# Patient Record
Sex: FEMALE | Race: WHITE | Employment: UNEMPLOYED | URBAN - METROPOLITAN AREA
[De-identification: names, ages, dates, MRNs, and addresses within clinical notes are randomized per-mention and may not be internally consistent; named-entity substitution may affect disease eponyms.]

---

## 2018-08-13 ENCOUNTER — HOSPITAL ENCOUNTER (OUTPATIENT)
Dept: OTHER | Age: 33
Discharge: HOME | End: 2018-08-20
Attending: ADVANCED PRACTICE MIDWIFE | Admitting: ADVANCED PRACTICE MIDWIFE

## 2018-11-10 ENCOUNTER — HOSPITAL ENCOUNTER (OUTPATIENT)
Age: 33
Discharge: HOME | End: 2018-11-10
Attending: OBSTETRICS & GYNECOLOGY | Admitting: OBSTETRICS & GYNECOLOGY
Payer: COMMERCIAL

## 2018-11-10 VITALS
SYSTOLIC BLOOD PRESSURE: 109 MMHG | RESPIRATION RATE: 18 BRPM | TEMPERATURE: 98.3 F | BODY MASS INDEX: 18.56 KG/M2 | DIASTOLIC BLOOD PRESSURE: 55 MMHG | HEART RATE: 83 BPM | HEIGHT: 66 IN

## 2018-11-10 LAB
AMYLASE: 59 U/L (ref 25–115)
BASOPHILS ABSOLUTE: 0 K/UL (ref 0–0.2)
BASOPHILS RELATIVE PERCENT: 0.2 %
BILIRUBIN URINE: NEGATIVE
BLOOD, URINE: ABNORMAL
CLARITY: CLEAR
COLOR: YELLOW
EOSINOPHILS ABSOLUTE: 0.1 K/UL (ref 0–0.6)
EOSINOPHILS RELATIVE PERCENT: 1.1 %
EPITHELIAL CELLS, UA: 2 /HPF (ref 0–5)
GLUCOSE URINE: NEGATIVE MG/DL
HCT VFR BLD CALC: 30.3 % (ref 36–48)
HEMOGLOBIN: 10.2 G/DL (ref 12–16)
HYALINE CASTS: 2 /LPF (ref 0–8)
KETONES, URINE: NEGATIVE MG/DL
LEUKOCYTE ESTERASE, URINE: NEGATIVE
LIPASE: 44 U/L (ref 13–60)
LYMPHOCYTES ABSOLUTE: 1.4 K/UL (ref 1–5.1)
LYMPHOCYTES RELATIVE PERCENT: 11.2 %
MCH RBC QN AUTO: 30.6 PG (ref 26–34)
MCHC RBC AUTO-ENTMCNC: 33.6 G/DL (ref 31–36)
MCV RBC AUTO: 91.1 FL (ref 80–100)
MICROSCOPIC EXAMINATION: YES
MONOCYTES ABSOLUTE: 0.9 K/UL (ref 0–1.3)
MONOCYTES RELATIVE PERCENT: 7.2 %
NEUTROPHILS ABSOLUTE: 10.3 K/UL (ref 1.7–7.7)
NEUTROPHILS RELATIVE PERCENT: 80.3 %
NITRITE, URINE: NEGATIVE
PDW BLD-RTO: 12.7 % (ref 12.4–15.4)
PH UA: 5.5
PLATELET # BLD: 228 K/UL (ref 135–450)
PMV BLD AUTO: 8.1 FL (ref 5–10.5)
PROTEIN UA: NEGATIVE MG/DL
RBC # BLD: 3.33 M/UL (ref 4–5.2)
RBC UA: 66 /HPF (ref 0–4)
SPECIFIC GRAVITY UA: 1.02
URINE TYPE: ABNORMAL
UROBILINOGEN, URINE: 0.2 E.U./DL
WBC # BLD: 12.9 K/UL (ref 4–11)
WBC UA: 4 /HPF (ref 0–5)

## 2018-11-10 PROCEDURE — 87086 URINE CULTURE/COLONY COUNT: CPT

## 2018-11-10 PROCEDURE — 99215 OFFICE O/P EST HI 40 MIN: CPT

## 2018-11-10 PROCEDURE — 85025 COMPLETE CBC W/AUTO DIFF WBC: CPT

## 2018-11-10 PROCEDURE — 81001 URINALYSIS AUTO W/SCOPE: CPT

## 2018-11-10 PROCEDURE — 83690 ASSAY OF LIPASE: CPT

## 2018-11-10 PROCEDURE — 99220 PR INITIAL OBSERVATION CARE/DAY 70 MINUTES: CPT | Performed by: OBSTETRICS & GYNECOLOGY

## 2018-11-10 PROCEDURE — 82150 ASSAY OF AMYLASE: CPT

## 2018-11-10 RX ORDER — ALBUTEROL SULFATE 90 UG/1
2 AEROSOL, METERED RESPIRATORY (INHALATION) EVERY 6 HOURS PRN
COMMUNITY

## 2018-11-10 RX ORDER — PRAZOSIN HYDROCHLORIDE 1 MG/1
3 CAPSULE ORAL NIGHTLY
COMMUNITY

## 2018-11-10 RX ORDER — LORAZEPAM 2 MG/1
2 TABLET ORAL EVERY 6 HOURS PRN
COMMUNITY

## 2018-11-10 RX ORDER — METOCLOPRAMIDE 10 MG/1
10 TABLET ORAL 4 TIMES DAILY
COMMUNITY

## 2018-11-10 RX ORDER — FLUTICASONE PROPIONATE 50 MCG
1 SPRAY, SUSPENSION (ML) NASAL DAILY
COMMUNITY

## 2018-11-10 RX ORDER — DESVENLAFAXINE 50 MG/1
50 TABLET, EXTENDED RELEASE ORAL DAILY
COMMUNITY

## 2018-11-10 NOTE — FLOWSHEET NOTE
Dr Regina Erazo notified of patient complaint and Dr Vivien Hernandez request for him to evaluate patient.  Orders received to draw CBC, Lipase, and Amalyse

## 2018-11-12 LAB — URINE CULTURE, ROUTINE: NORMAL

## 2018-11-27 ENCOUNTER — OFFICE VISIT (OUTPATIENT)
Dept: ENT CLINIC | Age: 33
End: 2018-11-27
Payer: COMMERCIAL

## 2018-11-27 VITALS
BODY MASS INDEX: 22.29 KG/M2 | WEIGHT: 142 LBS | HEART RATE: 93 BPM | DIASTOLIC BLOOD PRESSURE: 60 MMHG | HEIGHT: 67 IN | SYSTOLIC BLOOD PRESSURE: 120 MMHG

## 2018-11-27 DIAGNOSIS — J32.1 CHRONIC FRONTAL SINUSITIS: ICD-10-CM

## 2018-11-27 DIAGNOSIS — J30.9 ALLERGIC RHINITIS, UNSPECIFIED SEASONALITY, UNSPECIFIED TRIGGER: Primary | ICD-10-CM

## 2018-11-27 DIAGNOSIS — Z98.890 S/P FESS (FUNCTIONAL ENDOSCOPIC SINUS SURGERY): ICD-10-CM

## 2018-11-27 DIAGNOSIS — J32.2 CHRONIC ETHMOIDAL SINUSITIS: ICD-10-CM

## 2018-11-27 PROCEDURE — G8420 CALC BMI NORM PARAMETERS: HCPCS | Performed by: OTOLARYNGOLOGY

## 2018-11-27 PROCEDURE — G8427 DOCREV CUR MEDS BY ELIG CLIN: HCPCS | Performed by: OTOLARYNGOLOGY

## 2018-11-27 PROCEDURE — 31231 NASAL ENDOSCOPY DX: CPT | Performed by: OTOLARYNGOLOGY

## 2018-11-27 PROCEDURE — 1036F TOBACCO NON-USER: CPT | Performed by: OTOLARYNGOLOGY

## 2018-11-27 PROCEDURE — 99212 OFFICE O/P EST SF 10 MIN: CPT | Performed by: OTOLARYNGOLOGY

## 2018-11-27 PROCEDURE — G8484 FLU IMMUNIZE NO ADMIN: HCPCS | Performed by: OTOLARYNGOLOGY

## 2018-11-27 ASSESSMENT — ENCOUNTER SYMPTOMS
CONSTIPATION: 0
BLOOD IN STOOL: 0
VOICE CHANGE: 0
STRIDOR: 0
COLOR CHANGE: 0
EYE DISCHARGE: 0
COUGH: 0
APNEA: 0
SINUS PRESSURE: 1
TROUBLE SWALLOWING: 0
SHORTNESS OF BREATH: 0
BACK PAIN: 0
DIARRHEA: 0
EYE PAIN: 0
NAUSEA: 0
CHEST TIGHTNESS: 0
RHINORRHEA: 0
CHOKING: 0
FACIAL SWELLING: 0
SORE THROAT: 0
VOMITING: 0
SINUS PAIN: 0
WHEEZING: 0

## 2019-06-11 ENCOUNTER — OFFICE VISIT (OUTPATIENT)
Dept: ENT CLINIC | Age: 34
End: 2019-06-11
Payer: COMMERCIAL

## 2019-06-11 VITALS
HEIGHT: 66 IN | SYSTOLIC BLOOD PRESSURE: 97 MMHG | TEMPERATURE: 97.3 F | HEART RATE: 69 BPM | WEIGHT: 134.6 LBS | BODY MASS INDEX: 21.63 KG/M2 | DIASTOLIC BLOOD PRESSURE: 61 MMHG

## 2019-06-11 DIAGNOSIS — J32.4 CHRONIC PANSINUSITIS: Primary | ICD-10-CM

## 2019-06-11 PROCEDURE — G8420 CALC BMI NORM PARAMETERS: HCPCS | Performed by: OTOLARYNGOLOGY

## 2019-06-11 PROCEDURE — 1036F TOBACCO NON-USER: CPT | Performed by: OTOLARYNGOLOGY

## 2019-06-11 PROCEDURE — 31231 NASAL ENDOSCOPY DX: CPT | Performed by: OTOLARYNGOLOGY

## 2019-06-11 PROCEDURE — 99214 OFFICE O/P EST MOD 30 MIN: CPT | Performed by: OTOLARYNGOLOGY

## 2019-06-11 PROCEDURE — G8427 DOCREV CUR MEDS BY ELIG CLIN: HCPCS | Performed by: OTOLARYNGOLOGY

## 2019-06-11 RX ORDER — CEFDINIR 300 MG/1
300 CAPSULE ORAL 2 TIMES DAILY
Qty: 14 CAPSULE | Refills: 0 | Status: SHIPPED | OUTPATIENT
Start: 2019-06-11 | End: 2019-06-18

## 2019-06-11 RX ORDER — PREDNISONE 10 MG/1
TABLET ORAL
Qty: 38 TABLET | Refills: 0 | Status: SHIPPED | OUTPATIENT
Start: 2019-06-11

## 2019-06-11 ASSESSMENT — ENCOUNTER SYMPTOMS
COUGH: 0
DIARRHEA: 0
EYE ITCHING: 0
EYE REDNESS: 0
VOICE CHANGE: 0
SORE THROAT: 0
SINUS PAIN: 0
NAUSEA: 0
FACIAL SWELLING: 0
TROUBLE SWALLOWING: 0
CHOKING: 0
SINUS PRESSURE: 1
RHINORRHEA: 0
EYE PAIN: 0
SHORTNESS OF BREATH: 0

## 2019-06-11 NOTE — PROGRESS NOTES
Subjective:      Patient ID: Marcelline Claude is a 37 y.o. female. HPI  Chief Complaint   Patient presents with    Left sided facial pressure. History of Present Illness:Priti is a(n) 37 y.o. female who presents with a 2 week history of mid face, dental and frontal pressure. Saw dentist. Timmy [place] on augmentin. Xray with blocked eft maxillary sinus. No dental disease. Has a long history of sinus problems. No visual changes. No headaches.    Nasal Discharge: none  Nasal Obstruction: No   Post Nasal Drainage: Yes  Nasal Bleeding: No  Sense of Smell: normal  Eye Symptoms: none  Previous Treatments: augmentin     Patient Active Problem List   Diagnosis    Allergic rhinitis    Chronic ethmoidal sinusitis    Chronic frontal sinusitis    S/P FESS (functional endoscopic sinus surgery)     Past Surgical History:   Procedure Laterality Date    SINUS SURGERY      TONSILLECTOMY       Family History   Problem Relation Age of Onset    Cancer Mother     No Known Problems Father     No Known Problems Maternal Grandmother     No Known Problems Maternal Grandfather     No Known Problems Paternal Grandmother     No Known Problems Paternal Grandfather      Social History     Socioeconomic History    Marital status:      Spouse name: Not on file    Number of children: Not on file    Years of education: Not on file    Highest education level: Not on file   Occupational History    Not on file   Social Needs    Financial resource strain: Not on file    Food insecurity:     Worry: Not on file     Inability: Not on file    Transportation needs:     Medical: Not on file     Non-medical: Not on file   Tobacco Use    Smoking status: Never Smoker    Smokeless tobacco: Never Used   Substance and Sexual Activity    Alcohol use: No    Drug use: No    Sexual activity: Yes     Partners: Male   Lifestyle    Physical activity:     Days per week: Not on file     Minutes per session: Not on file    Stress: Not on file Relationships    Social connections:     Talks on phone: Not on file     Gets together: Not on file     Attends Bahai service: Not on file     Active member of club or organization: Not on file     Attends meetings of clubs or organizations: Not on file     Relationship status: Not on file    Intimate partner violence:     Fear of current or ex partner: Not on file     Emotionally abused: Not on file     Physically abused: Not on file     Forced sexual activity: Not on file   Other Topics Concern    Not on file   Social History Narrative    Not on file       DRUG/FOOD ALLERGIES: Sulfa antibiotics    CURRENT MEDICATIONS  Prior to Admission medications    Medication Sig Start Date End Date Taking? Authorizing Provider   desvenlafaxine succinate (PRISTIQ) 50 MG TB24 extended release tablet Take 50 mg by mouth daily    Historical Provider, MD   metoclopramide (REGLAN) 10 MG tablet Take 10 mg by mouth 4 times daily    Historical Provider, MD   albuterol sulfate  (90 Base) MCG/ACT inhaler Inhale 2 puffs into the lungs every 6 hours as needed for Wheezing    Historical Provider, MD   prazosin (MINIPRESS) 1 MG capsule Take 3 mg by mouth nightly    Historical Provider, MD   fluticasone (FLONASE) 50 MCG/ACT nasal spray 1 spray by Each Nare route daily    Historical Provider, MD   LORazepam (ATIVAN) 2 MG tablet Take 2 mg by mouth every 6 hours as needed for Anxiety. Latha Ovalle Historical Provider, MD   cetirizine (ZYRTEC) 10 MG tablet Take 10 mg by mouth daily    Historical Provider, MD   montelukast (SINGULAIR) 10 MG tablet Take 10 mg by mouth nightly    Historical Provider, MD   gabapentin (NEURONTIN) 300 MG capsule Take 300 mg by mouth 2 times daily    Historical Provider, MD   TRAZODONE HCL PO Take 200 mg by mouth    Historical Provider, MD   SUMAtriptan (IMITREX) 100 MG tablet Take 100 mg by mouth once as needed for Migraine.     Historical Provider, MD   propranolol (INDERAL) 20 MG tablet Take 20 mg by mouth 3 times daily. 20 in morning and 40 at night    Historical Provider, MD         Review of Systems   Constitutional: Negative for activity change, appetite change, chills, fatigue and fever. HENT: Positive for postnasal drip and sinus pressure. Negative for congestion, ear discharge, ear pain, facial swelling, hearing loss, nosebleeds, rhinorrhea, sinus pain, sneezing, sore throat, tinnitus, trouble swallowing and voice change. Eyes: Negative for pain, redness, itching and visual disturbance. Respiratory: Negative for cough, choking and shortness of breath. Gastrointestinal: Negative for diarrhea and nausea. Endocrine: Negative for cold intolerance and heat intolerance. Objective:   Physical Exam   Constitutional: She is oriented to person, place, and time. She appears well-developed and well-nourished. HENT:   Head: Not macrocephalic and not microcephalic. Head is without Olivares's sign, without abrasion, without laceration, without right periorbital erythema and without left periorbital erythema. Nose: No mucosal edema, rhinorrhea, nose lacerations, sinus tenderness, nasal deformity, septal deviation or nasal septal hematoma. No epistaxis. No foreign bodies. Right sinus exhibits no maxillary sinus tenderness and no frontal sinus tenderness. Left sinus exhibits no maxillary sinus tenderness and no frontal sinus tenderness. Mouth/Throat: Uvula is midline. No oropharyngeal exudate, posterior oropharyngeal edema, posterior oropharyngeal erythema or tonsillar abscesses. Eyes: Right eye exhibits no chemosis, no discharge and no exudate. Left eye exhibits no chemosis, no discharge and no exudate. Right conjunctiva is not injected. Left conjunctiva is not injected. Right eye exhibits normal extraocular motion. Left eye exhibits normal extraocular motion. Neck: No thyroid mass and no thyromegaly present. Cardiovascular: Normal rate and regular rhythm. Pulmonary/Chest: No tachypnea.  No respiratory distress. Lymphadenopathy:        Head (right side): No preauricular and no posterior auricular adenopathy present. Head (left side): No preauricular and no posterior auricular adenopathy present. Right cervical: No superficial cervical, no deep cervical and no posterior cervical adenopathy present. Left cervical: No superficial cervical, no deep cervical and no posterior cervical adenopathy present. Neurological: She is alert and oriented to person, place, and time. Nasal Endoscopy    Pre OP: left sded maxillary sinusitis  Post OP: no active purulence. Inflammatory changes  Procedure: Nasal endoscopy    After obtaining verbal consent from the patient 1% lidocaine with afrin was sprayed into the nasal cavities. After allowing a time for anesthesia, a nasal endoscope was placed into the nostril. The septum, inferior, and middle turbinates were examined. The middle meatus, and sphenoethmoid recess was examined bilaterally. Cultures were not obtained from the sinuses. There were no complications. Pertinent positives included: There was not edema and purulence in the left middle meatus. There was not edema and purulence at the right middle meatus. Polyps were identified in the frontal recess sinuses. Masses were not identified. Tolerated well without complication. I attest that I was present for and did the entire procedure myself. Assessment:       Diagnosis Orders   1. Chronic pansinusitis             Plan:      Acute flare of chronic sinusitis. Prednisone and cefdinir. CT if not imrpoved. Follow up as needed. The patient was counseled for chronic sinusitis and received a prednsone and cefdinir prescription medication(s) for this diagnosis. The mechanism of action for the prescription(s) were explained/reviewed. The appropriate usage was described and technique for topical use explained if appropriate.   Questions from the patient were answered and the

## 2019-06-13 ENCOUNTER — TELEPHONE (OUTPATIENT)
Dept: ENT CLINIC | Age: 34
End: 2019-06-13

## 2019-06-13 NOTE — TELEPHONE ENCOUNTER
Pt called is having problem w/ Prednisone. She only took 10mg yesterday at 9:00am and has been awake since then. She is asking for an alternative medication as she can not take the prednisone.  Her Phone # is [phone]

## 2019-07-10 ENCOUNTER — OFFICE VISIT (OUTPATIENT)
Dept: ENT CLINIC | Age: 34
End: 2019-07-10
Payer: COMMERCIAL

## 2019-07-10 VITALS
WEIGHT: 152 LBS | BODY MASS INDEX: 24.43 KG/M2 | SYSTOLIC BLOOD PRESSURE: 108 MMHG | DIASTOLIC BLOOD PRESSURE: 68 MMHG | HEART RATE: 76 BPM | TEMPERATURE: 98.2 F | HEIGHT: 66 IN

## 2019-07-10 DIAGNOSIS — J32.4 CHRONIC PANSINUSITIS: Primary | ICD-10-CM

## 2019-07-10 PROCEDURE — G8420 CALC BMI NORM PARAMETERS: HCPCS | Performed by: OTOLARYNGOLOGY

## 2019-07-10 PROCEDURE — 99212 OFFICE O/P EST SF 10 MIN: CPT | Performed by: OTOLARYNGOLOGY

## 2019-07-10 PROCEDURE — 31231 NASAL ENDOSCOPY DX: CPT | Performed by: OTOLARYNGOLOGY

## 2019-07-10 PROCEDURE — 1036F TOBACCO NON-USER: CPT | Performed by: OTOLARYNGOLOGY

## 2019-07-10 PROCEDURE — G8427 DOCREV CUR MEDS BY ELIG CLIN: HCPCS | Performed by: OTOLARYNGOLOGY

## 2019-07-10 ASSESSMENT — ENCOUNTER SYMPTOMS
CHOKING: 0
VOICE CHANGE: 0
FACIAL SWELLING: 0
EYE ITCHING: 0
SINUS PAIN: 0
NAUSEA: 0
COUGH: 0
TROUBLE SWALLOWING: 0
SINUS PRESSURE: 1
DIARRHEA: 0
EYE PAIN: 0
SORE THROAT: 0
RHINORRHEA: 0
EYE REDNESS: 0
SHORTNESS OF BREATH: 0

## 2019-07-10 NOTE — PROGRESS NOTES
Subjective:      Patient ID: Mack Cervantes is a 37 y.o. female. HPI  Chief Complaint   Patient presents with    Left sided facial pressure. History of Present Illness:Priti is a(n) 37 y.o. female who presents with a 2 week history of mid face, dental and frontal pressure. Saw dentist. [employer] [employer] on augmentin. Xray with blocked eft maxillary sinus. No dental disease. Has a long history of sinus problems. No visual changes. No headaches. Nasal Discharge: none  Nasal Obstruction: No   Post Nasal Drainage: Yes  Nasal Bleeding: No  Sense of Smell: normal  Eye Symptoms: none  Previous Treatments: augmentin     Treated with cefdinir. Could not tolerate prednisone. Anxiety and insomnia. Still with midface, dental and frontal pressure.  Bilateral.     Patient Active Problem List   Diagnosis    Allergic rhinitis    Chronic ethmoidal sinusitis    Chronic frontal sinusitis    S/P FESS (functional endoscopic sinus surgery)     Past Surgical History:   Procedure Laterality Date    SINUS SURGERY      TONSILLECTOMY       Family History   Problem Relation Age of Onset    Cancer Mother     No Known Problems Father     No Known Problems Maternal Grandmother     No Known Problems Maternal Grandfather     No Known Problems Paternal Grandmother     No Known Problems Paternal Grandfather      Social History     Socioeconomic History    Marital status:      Spouse name: Not on file    Number of children: Not on file    Years of education: Not on file    Highest education level: Not on file   Occupational History    Not on file   Social Needs    Financial resource strain: Not on file    Food insecurity:     Worry: Not on file     Inability: Not on file    Transportation needs:     Medical: Not on file     Non-medical: Not on file   Tobacco Use    Smoking status: Never Smoker    Smokeless tobacco: Never Used   Substance and Sexual Activity    Alcohol use: No    Drug use: No    Sexual activity: Yes exudate. Right conjunctiva is not injected. Left conjunctiva is not injected. Right eye exhibits normal extraocular motion. Left eye exhibits normal extraocular motion. Neck: No thyroid mass and no thyromegaly present. Cardiovascular: Normal rate and regular rhythm. Pulmonary/Chest: No tachypnea. No respiratory distress. Lymphadenopathy:        Head (right side): No preauricular and no posterior auricular adenopathy present. Head (left side): No preauricular and no posterior auricular adenopathy present. Right cervical: No superficial cervical, no deep cervical and no posterior cervical adenopathy present. Left cervical: No superficial cervical, no deep cervical and no posterior cervical adenopathy present. Neurological: She is alert and oriented to person, place, and time. Nasal Endoscopy    Nasal Endoscopy    Pre OP: chronic sinusitis  Post OP: same  Procedure: Nasal endoscopy    After obtaining verbal consent from the patient 1% lidocaine with afrin was sprayed into the nasal cavities. After allowing a time for anesthesia, a nasal endoscope was placed into the nostril. The septum, inferior, and middle turbinates were examined. The middle meatus, and sphenoethmoid recess was examined bilaterally. Cultures were not obtained from the sinuses. There were no complications. Pertinent positives included: There was edema and purulence in the left middle meatus. There was edema and purulence at the right middle meatus. Polyps were not identified in the sinuses. Masses were not identified. Tolerated well without complication. I attest that I was present for and did the entire procedure myself. Assessment:       Diagnosis Orders   1. Chronic pansinusitis  CT SINUS WO CONTRAST             Plan:        CT sinuses. Continue rinses and steroid sprays.    Follow up after CT        LEYLA Regalado MD

## 2019-07-16 ENCOUNTER — HOSPITAL ENCOUNTER (OUTPATIENT)
Dept: CT IMAGING | Age: 34
Discharge: HOME | End: 2019-07-16
Payer: COMMERCIAL

## 2019-07-16 DIAGNOSIS — J32.4 CHRONIC PANSINUSITIS: ICD-10-CM

## 2019-07-16 PROCEDURE — 70486 CT MAXILLOFACIAL W/O DYE: CPT

## 2019-07-17 ENCOUNTER — TELEPHONE (OUTPATIENT)
Dept: ENT CLINIC | Age: 34
End: 2019-07-17

## 2019-09-05 ENCOUNTER — APPOINTMENT (OUTPATIENT)
Dept: CT IMAGING | Age: 34
End: 2019-09-05
Payer: COMMERCIAL

## 2019-09-05 ENCOUNTER — HOSPITAL ENCOUNTER (EMERGENCY)
Age: 34
Discharge: HOME | End: 2019-09-05
Attending: EMERGENCY MEDICINE
Payer: COMMERCIAL

## 2019-09-05 ENCOUNTER — APPOINTMENT (OUTPATIENT)
Dept: ULTRASOUND IMAGING | Age: 34
End: 2019-09-05
Payer: COMMERCIAL

## 2019-09-05 VITALS
SYSTOLIC BLOOD PRESSURE: 122 MMHG | OXYGEN SATURATION: 100 % | BODY MASS INDEX: 24.11 KG/M2 | DIASTOLIC BLOOD PRESSURE: 74 MMHG | HEART RATE: 78 BPM | WEIGHT: 150 LBS | RESPIRATION RATE: 16 BRPM | TEMPERATURE: 98.3 F | HEIGHT: 66 IN

## 2019-09-05 DIAGNOSIS — R10.31 RIGHT LOWER QUADRANT ABDOMINAL PAIN: Primary | ICD-10-CM

## 2019-09-05 LAB
A/G RATIO: 1.5 (ref 1.1–2.2)
ALBUMIN SERPL-MCNC: 4.1 G/DL (ref 3.4–5)
ALP BLD-CCNC: 54 U/L (ref 40–129)
ALT SERPL-CCNC: 10 U/L (ref 10–40)
ANION GAP SERPL CALCULATED.3IONS-SCNC: 8 MMOL/L (ref 3–16)
AST SERPL-CCNC: 12 U/L (ref 15–37)
BILIRUB SERPL-MCNC: 0.3 MG/DL (ref 0–1)
BILIRUBIN URINE: NEGATIVE
BLOOD, URINE: NEGATIVE
BUN BLDV-MCNC: 15 MG/DL (ref 7–20)
CALCIUM SERPL-MCNC: 9.4 MG/DL (ref 8.3–10.6)
CHLORIDE BLD-SCNC: 106 MMOL/L (ref 99–110)
CLARITY: CLEAR
CO2: 25 MMOL/L (ref 21–32)
COLOR: YELLOW
CREAT SERPL-MCNC: 0.8 MG/DL (ref 0.6–1.1)
GFR AFRICAN AMERICAN: >60
GFR NON-AFRICAN AMERICAN: >60
GLOBULIN: 2.8 G/DL
GLUCOSE BLD-MCNC: 117 MG/DL (ref 70–99)
GLUCOSE URINE: NEGATIVE MG/DL
HCG(URINE) PREGNANCY TEST: NEGATIVE
HCT VFR BLD CALC: 34.4 % (ref 36–48)
HEMOGLOBIN: 11.6 G/DL (ref 12–16)
KETONES, URINE: NEGATIVE MG/DL
LEUKOCYTE ESTERASE, URINE: NEGATIVE
MCH RBC QN AUTO: 30.3 PG (ref 26–34)
MCHC RBC AUTO-ENTMCNC: 33.7 G/DL (ref 31–36)
MCV RBC AUTO: 89.9 FL (ref 80–100)
MICROSCOPIC EXAMINATION: NORMAL
NITRITE, URINE: NEGATIVE
PDW BLD-RTO: 13.6 % (ref 12.4–15.4)
PH UA: 6 (ref 5–8)
PLATELET # BLD: 202 K/UL (ref 135–450)
PMV BLD AUTO: 8.4 FL (ref 5–10.5)
POTASSIUM REFLEX MAGNESIUM: 4 MMOL/L (ref 3.5–5.1)
PROTEIN UA: NEGATIVE MG/DL
RBC # BLD: 3.83 M/UL (ref 4–5.2)
SODIUM BLD-SCNC: 139 MMOL/L (ref 136–145)
SPECIFIC GRAVITY UA: >=1.03 (ref 1–1.03)
TOTAL PROTEIN: 6.9 G/DL (ref 6.4–8.2)
URINE REFLEX TO CULTURE: NORMAL
URINE TYPE: NORMAL
UROBILINOGEN, URINE: 1 E.U./DL
WBC # BLD: 5.3 K/UL (ref 4–11)

## 2019-09-05 PROCEDURE — 76856 US EXAM PELVIC COMPLETE: CPT

## 2019-09-05 PROCEDURE — 6370000000 HC RX 637 (ALT 250 FOR IP): Performed by: EMERGENCY MEDICINE

## 2019-09-05 PROCEDURE — 6360000002 HC RX W HCPCS: Performed by: EMERGENCY MEDICINE

## 2019-09-05 PROCEDURE — 76830 TRANSVAGINAL US NON-OB: CPT

## 2019-09-05 PROCEDURE — 85027 COMPLETE CBC AUTOMATED: CPT

## 2019-09-05 PROCEDURE — 2580000003 HC RX 258: Performed by: EMERGENCY MEDICINE

## 2019-09-05 PROCEDURE — 99284 EMERGENCY DEPT VISIT MOD MDM: CPT

## 2019-09-05 PROCEDURE — 93975 VASCULAR STUDY: CPT

## 2019-09-05 PROCEDURE — 96374 THER/PROPH/DIAG INJ IV PUSH: CPT

## 2019-09-05 PROCEDURE — 74176 CT ABD & PELVIS W/O CONTRAST: CPT

## 2019-09-05 PROCEDURE — 81003 URINALYSIS AUTO W/O SCOPE: CPT

## 2019-09-05 PROCEDURE — 84703 CHORIONIC GONADOTROPIN ASSAY: CPT

## 2019-09-05 PROCEDURE — 96361 HYDRATE IV INFUSION ADD-ON: CPT

## 2019-09-05 PROCEDURE — 80053 COMPREHEN METABOLIC PANEL: CPT

## 2019-09-05 RX ORDER — MORPHINE SULFATE 4 MG/ML
4 INJECTION, SOLUTION INTRAMUSCULAR; INTRAVENOUS ONCE
Status: COMPLETED | OUTPATIENT
Start: 2019-09-05 | End: 2019-09-05

## 2019-09-05 RX ORDER — KETOROLAC TROMETHAMINE 30 MG/ML
30 INJECTION, SOLUTION INTRAMUSCULAR; INTRAVENOUS ONCE
Status: COMPLETED | OUTPATIENT
Start: 2019-09-05 | End: 2019-09-05

## 2019-09-05 RX ORDER — MORPHINE SULFATE 2 MG/ML
INJECTION, SOLUTION INTRAMUSCULAR; INTRAVENOUS
Status: DISCONTINUED
Start: 2019-09-05 | End: 2019-09-05 | Stop reason: HOSPADM

## 2019-09-05 RX ORDER — 0.9 % SODIUM CHLORIDE 0.9 %
1000 INTRAVENOUS SOLUTION INTRAVENOUS ONCE
Status: COMPLETED | OUTPATIENT
Start: 2019-09-05 | End: 2019-09-05

## 2019-09-05 RX ORDER — ACETAMINOPHEN 325 MG/1
650 TABLET ORAL ONCE
Status: COMPLETED | OUTPATIENT
Start: 2019-09-05 | End: 2019-09-05

## 2019-09-05 RX ORDER — ONDANSETRON 2 MG/ML
4 INJECTION INTRAMUSCULAR; INTRAVENOUS ONCE
Status: COMPLETED | OUTPATIENT
Start: 2019-09-05 | End: 2019-09-05

## 2019-09-05 RX ORDER — OXYCODONE HYDROCHLORIDE 5 MG/1
5 TABLET ORAL EVERY 6 HOURS PRN
Qty: 3 TABLET | Refills: 0 | Status: SHIPPED | OUTPATIENT
Start: 2019-09-05 | End: 2019-09-12

## 2019-09-05 RX ORDER — OXYCODONE HYDROCHLORIDE 5 MG/1
5 TABLET ORAL ONCE
Status: COMPLETED | OUTPATIENT
Start: 2019-09-05 | End: 2019-09-05

## 2019-09-05 RX ORDER — ONDANSETRON HYDROCHLORIDE 8 MG/1
8 TABLET, FILM COATED ORAL EVERY 8 HOURS PRN
Qty: 20 TABLET | Refills: 0 | Status: SHIPPED | OUTPATIENT
Start: 2019-09-05

## 2019-09-05 RX ADMIN — ONDANSETRON 4 MG: 2 INJECTION INTRAMUSCULAR; INTRAVENOUS at 15:16

## 2019-09-05 RX ADMIN — SODIUM CHLORIDE 1000 ML: 9 INJECTION, SOLUTION INTRAVENOUS at 15:16

## 2019-09-05 RX ADMIN — SODIUM CHLORIDE 1000 ML: 9 INJECTION, SOLUTION INTRAVENOUS at 19:19

## 2019-09-05 RX ADMIN — OXYCODONE HYDROCHLORIDE 5 MG: 5 TABLET ORAL at 19:19

## 2019-09-05 RX ADMIN — ONDANSETRON 4 MG: 2 INJECTION INTRAMUSCULAR; INTRAVENOUS at 19:19

## 2019-09-05 RX ADMIN — MORPHINE SULFATE 4 MG: 4 INJECTION, SOLUTION INTRAMUSCULAR; INTRAVENOUS at 17:21

## 2019-09-05 RX ADMIN — KETOROLAC TROMETHAMINE 30 MG: 30 INJECTION, SOLUTION INTRAMUSCULAR at 15:16

## 2019-09-05 RX ADMIN — ACETAMINOPHEN 650 MG: 325 TABLET ORAL at 19:19

## 2019-09-05 ASSESSMENT — ENCOUNTER SYMPTOMS
CHOKING: 0
SHORTNESS OF BREATH: 0
CHEST TIGHTNESS: 0
CONSTIPATION: 0
EYES NEGATIVE: 1
BLOOD IN STOOL: 0
DIARRHEA: 0
BACK PAIN: 0
NAUSEA: 1
VOMITING: 1
SORE THROAT: 0
COUGH: 0
ABDOMINAL PAIN: 1
EYE ITCHING: 0
EYE DISCHARGE: 0

## 2019-09-05 ASSESSMENT — PAIN DESCRIPTION - ORIENTATION
ORIENTATION: RIGHT

## 2019-09-05 ASSESSMENT — PAIN DESCRIPTION - LOCATION
LOCATION: ABDOMEN

## 2019-09-05 ASSESSMENT — PAIN SCALES - GENERAL
PAINLEVEL_OUTOF10: 9
PAINLEVEL_OUTOF10: 8
PAINLEVEL_OUTOF10: 8
PAINLEVEL_OUTOF10: 6
PAINLEVEL_OUTOF10: 5

## 2019-09-05 ASSESSMENT — PAIN DESCRIPTION - DESCRIPTORS
DESCRIPTORS: ACHING
DESCRIPTORS: ACHING

## 2019-09-05 ASSESSMENT — PAIN DESCRIPTION - PAIN TYPE
TYPE: ACUTE PAIN

## 2019-09-05 NOTE — ED NOTES
Report called to Colt Amaya at [facility] ed, patient to transfer per [employer] ETA 5:45 pm, patient and family updated.      Heath Zhao RN  09/05/19 9780

## 2019-09-05 NOTE — ED NOTES
Patient report given to enrique Sharp, patient to [facility] ED.       Carl Paz RN  09/05/19 0888

## 2019-09-05 NOTE — ED PROVIDER NOTES
dysuria and flank pain. Musculoskeletal: Negative for back pain. Allergic/Immunologic: Negative for immunocompromised state. Neurological: Negative for headaches. Past Medical, Surgical, Family, and Social History     She has a past medical history of Allergic rhinitis, Anxiety, Asthma, Cervical pain (neck), Depression, Dizziness, Fibromyalgia, Hearing loss, Kidney stone, Migraine, PTSD (post-traumatic stress disorder), Seizures ([address] and Sinusitis. She has a past surgical history that includes Tonsillectomy and sinus surgery. Her family history includes Cancer in her mother; No Known Problems in her father, maternal grandfather, maternal grandmother, paternal grandfather, and paternal grandmother. She reports that she has never smoked. She has never used smokeless tobacco. She reports that she does not drink alcohol or use drugs. Medications     Previous Medications    ALBUTEROL SULFATE  (90 BASE) MCG/ACT INHALER    Inhale 2 puffs into the lungs every 6 hours as needed for Wheezing    CETIRIZINE (ZYRTEC) 10 MG TABLET    Take 10 mg by mouth daily    DESVENLAFAXINE SUCCINATE (PRISTIQ) 50 MG TB24 EXTENDED RELEASE TABLET    Take 50 mg by mouth daily    FLUTICASONE (FLONASE) 50 MCG/ACT NASAL SPRAY    1 spray by Each Nare route daily    GABAPENTIN (NEURONTIN) 300 MG CAPSULE    Take 300 mg by mouth 2 times daily    LORAZEPAM (ATIVAN) 2 MG TABLET    Take 2 mg by mouth every 6 hours as needed for Anxiety. Lavada Saucer METOCLOPRAMIDE (REGLAN) 10 MG TABLET    Take 10 mg by mouth 4 times daily    MONTELUKAST (SINGULAIR) 10 MG TABLET    Take 10 mg by mouth nightly    PRAZOSIN (MINIPRESS) 1 MG CAPSULE    Take 3 mg by mouth nightly    PREDNISONE (DELTASONE) 10 MG TABLET    4 tabs a day for 5 days, 3 tabs a day for 3 days, 2 tabs a day for 3 days,1 tab a day for 3 days    PROPRANOLOL (INDERAL) 20 MG TABLET    Take 20 mg by mouth 3 times daily.  20 in morning and 40 at night    SUMATRIPTAN (IMITREX) 100 MG TABLET instruction. Given her history and continued abdominal pain my exam there is concern for potential ovarian torsion. Patient reported she still felt dry and nauseated and as her capillary refill remained mildly delayed she was ordered a liter of fluids along with an oxycodone, Tylenol, and Zofran. She stated she had not received the full liter at the OSH. Ultrasound was ordered as well. On repeat assessment patient reported she had continued though improved discomfort. Nausea had dissipated. We discussed results of her labs as she stated she had not been told the results of those earlier. Ultrasound negative for torsion, did have a 2cm left ovarian cyst - results were discussed with patient and  at the bedside. Discussed importance of follow up with gynecology and she states she would like to see the same physician she had been seeing at [facility] for her pregnancy, Dr. Maria M Wilson - her information was placed in the discharge paperwork. We discussed use of motrin/tylenol for her symptoms along with zofran. As she is concerned for breakthrough pain I did write her a prescription for oxycodone 5mg three tablets with specific instructions to use for breakthrough pain if non-narcotic medications did not help alleviate her pain. Prior to discharge discussed return precautions which she and her  verbalized understanding of. Clinical Impression     1.  Right lower quadrant abdominal pain        Disposition     PATIENT REFERRED TO:  [facility]  [address] [facility]  [phone]    Schedule an appointment as soon as possible for a visit   For follow up appointment    Charlsie Opitz  [address] [facility] [address]  [phone]    Schedule an appointment as soon as possible for a visit   For follow up appointment      DISCHARGE MEDICATIONS:  New Prescriptions    ONDANSETRON (ZOFRAN) 8 MG TABLET    Take 1 tablet by mouth every 8 hours as needed

## 2019-09-05 NOTE — ED NOTES
[facility] for [address] Noel RN  09/05/19 4293

## 2019-09-05 NOTE — ED PROVIDER NOTES
alcohol., Disp-3 tablet, R-0Print      ondansetron (ZOFRAN) 8 MG tablet Take 1 tablet by mouth every 8 hours as needed for Nausea, Disp-20 tablet, R-0Print             CLINICAL IMPRESSION  1. Right lower quadrant abdominal pain        /74   Pulse 78   Temp 98.3 °F (36.8 °C) (Oral)   Resp 16   Ht 5' 6\" (1.676 m)   Wt 68 kg (150 lb)   LMP 08/29/2019   SpO2 100%   BMI 24.21 kg/m²     DISPOSITION  Edi Almanza was transferred by ambulance to the [place] [address] ED in stable condition.                    Sierra Celis MD  09/06/19 1389

## 2019-10-09 ENCOUNTER — HOSPITAL ENCOUNTER (EMERGENCY)
Age: 34
Discharge: HOME | End: 2019-10-09
Attending: EMERGENCY MEDICINE
Payer: COMMERCIAL

## 2019-10-09 ENCOUNTER — APPOINTMENT (OUTPATIENT)
Dept: CT IMAGING | Age: 34
End: 2019-10-09
Payer: COMMERCIAL

## 2019-10-09 VITALS
WEIGHT: 145 LBS | SYSTOLIC BLOOD PRESSURE: 98 MMHG | TEMPERATURE: 97.9 F | OXYGEN SATURATION: 97 % | HEIGHT: 66 IN | HEART RATE: 83 BPM | BODY MASS INDEX: 23.3 KG/M2 | DIASTOLIC BLOOD PRESSURE: 70 MMHG | RESPIRATION RATE: 18 BRPM

## 2019-10-09 DIAGNOSIS — R10.9 FLANK PAIN: Primary | ICD-10-CM

## 2019-10-09 LAB
ANION GAP SERPL CALCULATED.3IONS-SCNC: 9 MMOL/L (ref 3–16)
BACTERIA: ABNORMAL /HPF
BASOPHILS ABSOLUTE: 0.1 K/UL (ref 0–0.2)
BASOPHILS RELATIVE PERCENT: 0.8 %
BILIRUBIN URINE: NEGATIVE
BLOOD, URINE: ABNORMAL
BUN BLDV-MCNC: 16 MG/DL (ref 7–20)
CALCIUM SERPL-MCNC: 9 MG/DL (ref 8.3–10.6)
CHLORIDE BLD-SCNC: 104 MMOL/L (ref 99–110)
CLARITY: CLEAR
CO2: 24 MMOL/L (ref 21–32)
COLOR: YELLOW
CREAT SERPL-MCNC: 0.9 MG/DL (ref 0.6–1.1)
EOSINOPHILS ABSOLUTE: 0.2 K/UL (ref 0–0.6)
EOSINOPHILS RELATIVE PERCENT: 2.9 %
EPITHELIAL CELLS, UA: ABNORMAL /HPF
GFR AFRICAN AMERICAN: >60
GFR NON-AFRICAN AMERICAN: >60
GLUCOSE BLD-MCNC: 113 MG/DL (ref 70–99)
GLUCOSE URINE: NEGATIVE MG/DL
HCG(URINE) PREGNANCY TEST: NEGATIVE
HCT VFR BLD CALC: 35.1 % (ref 36–48)
HEMOGLOBIN: 12.1 G/DL (ref 12–16)
KETONES, URINE: ABNORMAL MG/DL
LEUKOCYTE ESTERASE, URINE: NEGATIVE
LYMPHOCYTES ABSOLUTE: 1.9 K/UL (ref 1–5.1)
LYMPHOCYTES RELATIVE PERCENT: 28.1 %
MCH RBC QN AUTO: 31.4 PG (ref 26–34)
MCHC RBC AUTO-ENTMCNC: 34.5 G/DL (ref 31–36)
MCV RBC AUTO: 91 FL (ref 80–100)
MICROSCOPIC EXAMINATION: YES
MONOCYTES ABSOLUTE: 0.6 K/UL (ref 0–1.3)
MONOCYTES RELATIVE PERCENT: 9 %
MUCUS: ABNORMAL /LPF
NEUTROPHILS ABSOLUTE: 4 K/UL (ref 1.7–7.7)
NEUTROPHILS RELATIVE PERCENT: 59.2 %
NITRITE, URINE: NEGATIVE
PDW BLD-RTO: 13.2 % (ref 12.4–15.4)
PH UA: 6 (ref 5–8)
PLATELET # BLD: 249 K/UL (ref 135–450)
PMV BLD AUTO: 7.9 FL (ref 5–10.5)
POTASSIUM REFLEX MAGNESIUM: 4 MMOL/L (ref 3.5–5.1)
PROTEIN UA: 30 MG/DL
RBC # BLD: 3.86 M/UL (ref 4–5.2)
RBC UA: ABNORMAL /HPF (ref 0–2)
SODIUM BLD-SCNC: 137 MMOL/L (ref 136–145)
SPECIFIC GRAVITY UA: >=1.03 (ref 1–1.03)
URINE REFLEX TO CULTURE: ABNORMAL
URINE TYPE: ABNORMAL
UROBILINOGEN, URINE: 0.2 E.U./DL
WBC # BLD: 6.8 K/UL (ref 4–11)
WBC UA: ABNORMAL /HPF (ref 0–5)

## 2019-10-09 PROCEDURE — 99284 EMERGENCY DEPT VISIT MOD MDM: CPT

## 2019-10-09 PROCEDURE — 96374 THER/PROPH/DIAG INJ IV PUSH: CPT

## 2019-10-09 PROCEDURE — 36415 COLL VENOUS BLD VENIPUNCTURE: CPT

## 2019-10-09 PROCEDURE — 85025 COMPLETE CBC W/AUTO DIFF WBC: CPT

## 2019-10-09 PROCEDURE — 6360000002 HC RX W HCPCS: Performed by: EMERGENCY MEDICINE

## 2019-10-09 PROCEDURE — 80048 BASIC METABOLIC PNL TOTAL CA: CPT

## 2019-10-09 PROCEDURE — 84703 CHORIONIC GONADOTROPIN ASSAY: CPT

## 2019-10-09 PROCEDURE — 81001 URINALYSIS AUTO W/SCOPE: CPT

## 2019-10-09 RX ORDER — KETOROLAC TROMETHAMINE 30 MG/ML
15 INJECTION, SOLUTION INTRAMUSCULAR; INTRAVENOUS ONCE
Status: DISCONTINUED | OUTPATIENT
Start: 2019-10-09 | End: 2019-10-09

## 2019-10-09 RX ORDER — MORPHINE SULFATE 4 MG/ML
4 INJECTION, SOLUTION INTRAMUSCULAR; INTRAVENOUS
Status: DISCONTINUED | OUTPATIENT
Start: 2019-10-09 | End: 2019-10-09 | Stop reason: HOSPADM

## 2019-10-09 RX ADMIN — MORPHINE SULFATE 4 MG: 4 INJECTION INTRAVENOUS at 02:03

## 2019-10-09 ASSESSMENT — PAIN SCALES - GENERAL
PAINLEVEL_OUTOF10: 9
PAINLEVEL_OUTOF10: 9

## 2019-10-09 ASSESSMENT — PAIN DESCRIPTION - DESCRIPTORS: DESCRIPTORS: ACHING

## 2019-10-09 ASSESSMENT — ENCOUNTER SYMPTOMS
EYE PAIN: 0
VOMITING: 0
WHEEZING: 0
SHORTNESS OF BREATH: 0
ABDOMINAL PAIN: 0
NAUSEA: 0
COUGH: 0
DIARRHEA: 0

## 2019-10-09 ASSESSMENT — PAIN DESCRIPTION - PAIN TYPE: TYPE: ACUTE PAIN

## 2019-10-09 ASSESSMENT — PAIN DESCRIPTION - ORIENTATION: ORIENTATION: RIGHT;LOWER

## 2019-10-09 ASSESSMENT — PAIN DESCRIPTION - LOCATION: LOCATION: BACK

## 2024-03-01 ENCOUNTER — HOSPITAL ENCOUNTER (INPATIENT)
Age: 39
LOS: 1 days | Discharge: HOME | DRG: 694 | End: 2024-03-01
Attending: EMERGENCY MEDICINE | Admitting: SURGERY
Payer: COMMERCIAL

## 2024-03-01 ENCOUNTER — APPOINTMENT (OUTPATIENT)
Dept: CT IMAGING | Age: 39
DRG: 694 | End: 2024-03-01
Payer: COMMERCIAL

## 2024-03-01 VITALS
SYSTOLIC BLOOD PRESSURE: 102 MMHG | DIASTOLIC BLOOD PRESSURE: 63 MMHG | HEART RATE: 90 BPM | TEMPERATURE: 97.7 F | RESPIRATION RATE: 16 BRPM | OXYGEN SATURATION: 100 % | HEIGHT: 66 IN | BODY MASS INDEX: 19.77 KG/M2 | WEIGHT: 123 LBS

## 2024-03-01 DIAGNOSIS — R10.9 FLANK PAIN: Primary | ICD-10-CM

## 2024-03-01 DIAGNOSIS — R10.9 ABDOMINAL PAIN, UNSPECIFIED ABDOMINAL LOCATION: ICD-10-CM

## 2024-03-01 LAB
ALBUMIN SERPL-MCNC: 4.2 G/DL (ref 3.4–5)
ALBUMIN/GLOB SERPL: 1.5 {RATIO} (ref 1.1–2.2)
ALP SERPL-CCNC: 61 U/L (ref 40–129)
ALT SERPL-CCNC: 9 U/L (ref 10–40)
ANION GAP SERPL CALCULATED.3IONS-SCNC: 10 MMOL/L (ref 3–16)
AST SERPL-CCNC: 15 U/L (ref 15–37)
BACTERIA URNS QL MICRO: ABNORMAL /HPF
BASOPHILS # BLD: 0.3 K/UL (ref 0–0.2)
BASOPHILS NFR BLD: 4.6 %
BILIRUB SERPL-MCNC: 0.3 MG/DL (ref 0–1)
BILIRUB UR QL STRIP.AUTO: ABNORMAL
BUN SERPL-MCNC: 14 MG/DL (ref 7–20)
CALCIUM SERPL-MCNC: 9.4 MG/DL (ref 8.3–10.6)
CHLORIDE SERPL-SCNC: 106 MMOL/L (ref 99–110)
CLARITY UR: ABNORMAL
CO2 SERPL-SCNC: 22 MMOL/L (ref 21–32)
COLOR UR: YELLOW
CREAT SERPL-MCNC: 0.9 MG/DL (ref 0.6–1.1)
CRYSTALS URNS MICRO: ABNORMAL /HPF
DEPRECATED RDW RBC AUTO: 13.2 % (ref 12.4–15.4)
EOSINOPHIL # BLD: 0.2 K/UL (ref 0–0.6)
EOSINOPHIL NFR BLD: 3.2 %
EPI CELLS #/AREA URNS HPF: ABNORMAL /HPF (ref 0–5)
GFR SERPLBLD CREATININE-BSD FMLA CKD-EPI: >60 ML/MIN/{1.73_M2}
GLUCOSE SERPL-MCNC: 113 MG/DL (ref 70–99)
GLUCOSE UR STRIP.AUTO-MCNC: NEGATIVE MG/DL
HCG SERPL QL: NEGATIVE
HCT VFR BLD AUTO: 35.7 % (ref 36–48)
HGB BLD-MCNC: 12.2 G/DL (ref 12–16)
HGB UR QL STRIP.AUTO: ABNORMAL
KETONES UR STRIP.AUTO-MCNC: 15 MG/DL
LEUKOCYTE ESTERASE UR QL STRIP.AUTO: NEGATIVE
LIPASE SERPL-CCNC: 31 U/L (ref 13–60)
LYMPHOCYTES # BLD: 0.5 K/UL (ref 1–5.1)
LYMPHOCYTES NFR BLD: 7.6 %
MCH RBC QN AUTO: 30.7 PG (ref 26–34)
MCHC RBC AUTO-ENTMCNC: 34.1 G/DL (ref 31–36)
MCV RBC AUTO: 90 FL (ref 80–100)
MONOCYTES # BLD: 0.7 K/UL (ref 0–1.3)
MONOCYTES NFR BLD: 9.9 %
MUCOUS THREADS #/AREA URNS LPF: ABNORMAL /LPF
NEUTROPHILS # BLD: 5 K/UL (ref 1.7–7.7)
NEUTROPHILS NFR BLD: 74.7 %
NITRITE UR QL STRIP.AUTO: NEGATIVE
PH UR STRIP.AUTO: 5 [PH] (ref 5–8)
PLATELET # BLD AUTO: 233 K/UL (ref 135–450)
PMV BLD AUTO: 7.5 FL (ref 5–10.5)
POTASSIUM SERPL-SCNC: 4.2 MMOL/L (ref 3.5–5.1)
PROT SERPL-MCNC: 7 G/DL (ref 6.4–8.2)
PROT UR STRIP.AUTO-MCNC: 30 MG/DL
RBC # BLD AUTO: 3.96 M/UL (ref 4–5.2)
RBC #/AREA URNS HPF: ABNORMAL /HPF (ref 0–4)
SODIUM SERPL-SCNC: 138 MMOL/L (ref 136–145)
SP GR UR STRIP.AUTO: >=1.03 (ref 1–1.03)
UA COMPLETE W REFLEX CULTURE PNL UR: ABNORMAL
UA DIPSTICK W REFLEX MICRO PNL UR: YES
URN SPEC COLLECT METH UR: ABNORMAL
UROBILINOGEN UR STRIP-ACNC: 1 E.U./DL
WBC # BLD AUTO: 6.7 K/UL (ref 4–11)
WBC #/AREA URNS HPF: ABNORMAL /HPF (ref 0–5)

## 2024-03-01 PROCEDURE — 84703 CHORIONIC GONADOTROPIN ASSAY: CPT

## 2024-03-01 PROCEDURE — 74176 CT ABD & PELVIS W/O CONTRAST: CPT

## 2024-03-01 PROCEDURE — 85025 COMPLETE CBC W/AUTO DIFF WBC: CPT

## 2024-03-01 PROCEDURE — 6360000002 HC RX W HCPCS: Performed by: EMERGENCY MEDICINE

## 2024-03-01 PROCEDURE — 83690 ASSAY OF LIPASE: CPT

## 2024-03-01 PROCEDURE — 81001 URINALYSIS AUTO W/SCOPE: CPT

## 2024-03-01 PROCEDURE — 2580000003 HC RX 258: Performed by: EMERGENCY MEDICINE

## 2024-03-01 PROCEDURE — 96375 TX/PRO/DX INJ NEW DRUG ADDON: CPT

## 2024-03-01 PROCEDURE — 1200000000 HC SEMI PRIVATE

## 2024-03-01 PROCEDURE — 6370000000 HC RX 637 (ALT 250 FOR IP): Performed by: EMERGENCY MEDICINE

## 2024-03-01 PROCEDURE — 96374 THER/PROPH/DIAG INJ IV PUSH: CPT

## 2024-03-01 PROCEDURE — 99284 EMERGENCY DEPT VISIT MOD MDM: CPT

## 2024-03-01 PROCEDURE — 80053 COMPREHEN METABOLIC PANEL: CPT

## 2024-03-01 RX ORDER — GABAPENTIN 100 MG/1
300 CAPSULE ORAL 2 TIMES DAILY
Status: CANCELLED | OUTPATIENT
Start: 2024-03-01

## 2024-03-01 RX ORDER — ENOXAPARIN SODIUM 100 MG/ML
40 INJECTION SUBCUTANEOUS DAILY
Status: CANCELLED | OUTPATIENT
Start: 2024-03-01

## 2024-03-01 RX ORDER — SODIUM CHLORIDE 0.9 % (FLUSH) 0.9 %
5-40 SYRINGE (ML) INJECTION EVERY 12 HOURS SCHEDULED
Status: CANCELLED | OUTPATIENT
Start: 2024-03-01

## 2024-03-01 RX ORDER — TRAZODONE HYDROCHLORIDE 100 MG/1
200 TABLET ORAL NIGHTLY
Status: CANCELLED | OUTPATIENT
Start: 2024-03-01

## 2024-03-01 RX ORDER — LORAZEPAM 0.5 MG/1
2 TABLET ORAL EVERY 6 HOURS PRN
Status: CANCELLED | OUTPATIENT
Start: 2024-03-01

## 2024-03-01 RX ORDER — ONDANSETRON 2 MG/ML
4 INJECTION INTRAMUSCULAR; INTRAVENOUS EVERY 6 HOURS PRN
Status: CANCELLED | OUTPATIENT
Start: 2024-03-01

## 2024-03-01 RX ORDER — KETOROLAC TROMETHAMINE 15 MG/ML
15 INJECTION, SOLUTION INTRAMUSCULAR; INTRAVENOUS ONCE
Status: COMPLETED | OUTPATIENT
Start: 2024-03-01 | End: 2024-03-01

## 2024-03-01 RX ORDER — PROPRANOLOL HYDROCHLORIDE 20 MG/1
20 TABLET ORAL 3 TIMES DAILY
Status: CANCELLED | OUTPATIENT
Start: 2024-03-01

## 2024-03-01 RX ORDER — ONDANSETRON 2 MG/ML
4 INJECTION INTRAMUSCULAR; INTRAVENOUS ONCE
Status: COMPLETED | OUTPATIENT
Start: 2024-03-01 | End: 2024-03-01

## 2024-03-01 RX ORDER — 0.9 % SODIUM CHLORIDE 0.9 %
1000 INTRAVENOUS SOLUTION INTRAVENOUS ONCE
Status: COMPLETED | OUTPATIENT
Start: 2024-03-01 | End: 2024-03-01

## 2024-03-01 RX ORDER — DROPERIDOL 2.5 MG/ML
1.25 INJECTION, SOLUTION INTRAMUSCULAR; INTRAVENOUS ONCE
Status: COMPLETED | OUTPATIENT
Start: 2024-03-01 | End: 2024-03-01

## 2024-03-01 RX ORDER — SODIUM CHLORIDE 9 MG/ML
INJECTION, SOLUTION INTRAVENOUS CONTINUOUS
Status: CANCELLED | OUTPATIENT
Start: 2024-03-01

## 2024-03-01 RX ORDER — METOCLOPRAMIDE 10 MG/1
10 TABLET ORAL 4 TIMES DAILY
Status: CANCELLED | OUTPATIENT
Start: 2024-03-01

## 2024-03-01 RX ORDER — SODIUM CHLORIDE 9 MG/ML
INJECTION, SOLUTION INTRAVENOUS PRN
Status: CANCELLED | OUTPATIENT
Start: 2024-03-01

## 2024-03-01 RX ORDER — MONTELUKAST SODIUM 10 MG/1
10 TABLET ORAL NIGHTLY
Status: CANCELLED | OUTPATIENT
Start: 2024-03-01

## 2024-03-01 RX ORDER — SODIUM CHLORIDE 0.9 % (FLUSH) 0.9 %
5-40 SYRINGE (ML) INJECTION PRN
Status: CANCELLED | OUTPATIENT
Start: 2024-03-01

## 2024-03-01 RX ORDER — POLYETHYLENE GLYCOL 3350 17 G/17G
17 POWDER, FOR SOLUTION ORAL DAILY PRN
Status: CANCELLED | OUTPATIENT
Start: 2024-03-01

## 2024-03-01 RX ORDER — IPRATROPIUM BROMIDE AND ALBUTEROL SULFATE 2.5; .5 MG/3ML; MG/3ML
1 SOLUTION RESPIRATORY (INHALATION) EVERY 4 HOURS PRN
Status: CANCELLED | OUTPATIENT
Start: 2024-03-01

## 2024-03-01 RX ORDER — ONDANSETRON 4 MG/1
4 TABLET, ORALLY DISINTEGRATING ORAL EVERY 8 HOURS PRN
Status: CANCELLED | OUTPATIENT
Start: 2024-03-01

## 2024-03-01 RX ORDER — HYDROCODONE BITARTRATE AND ACETAMINOPHEN 5; 325 MG/1; MG/1
1 TABLET ORAL ONCE
Status: COMPLETED | OUTPATIENT
Start: 2024-03-01 | End: 2024-03-01

## 2024-03-01 RX ORDER — VENLAFAXINE HYDROCHLORIDE 37.5 MG/1
75 TABLET, EXTENDED RELEASE ORAL
Status: CANCELLED | OUTPATIENT
Start: 2024-03-02

## 2024-03-01 RX ADMIN — KETOROLAC TROMETHAMINE 15 MG: 15 INJECTION, SOLUTION INTRAMUSCULAR; INTRAVENOUS at 11:31

## 2024-03-01 RX ADMIN — DROPERIDOL 1.25 MG: 2.5 INJECTION, SOLUTION INTRAMUSCULAR; INTRAVENOUS at 13:19

## 2024-03-01 RX ADMIN — ONDANSETRON 4 MG: 2 INJECTION INTRAMUSCULAR; INTRAVENOUS at 11:31

## 2024-03-01 RX ADMIN — HYDROCODONE BITARTRATE AND ACETAMINOPHEN 1 TABLET: 5; 325 TABLET ORAL at 11:32

## 2024-03-01 RX ADMIN — HYDROMORPHONE HYDROCHLORIDE 0.5 MG: 1 INJECTION, SOLUTION INTRAMUSCULAR; INTRAVENOUS; SUBCUTANEOUS at 12:16

## 2024-03-01 RX ADMIN — SODIUM CHLORIDE 1000 ML: 9 INJECTION, SOLUTION INTRAVENOUS at 11:37

## 2024-03-01 NOTE — ED NOTES
Entered room, pt sleeping. Mother wants to know why the patient has not been transported. Informed mother that pt does not have a bed yet. Patient states that another person said she has a bed. Informed the mother that she has been assigned a bed but is not ready.

## 2024-03-01 NOTE — ED PROVIDER NOTES
[facility] EMERGENCY DEPARTMENT  EMERGENCY DEPARTMENT ENCOUNTER        Patient Name: Priti Louise  MRN: [id number]  Birthdate 1985  Date of evaluation: 3/1/2024  Provider: Yomi Lassiter MD  PCP: Allison Cuellar  Note Started: 11:21 AM EST 3/1/24    CHIEF COMPLAINT       Back Pain and Flank Pain      HISTORY OF PRESENT ILLNESS: 1 or more Elements     History from : Patient    Limitations to history : None    Priti Louise is a 38 y.o. female who presents for evaluation of flank pain.  Patient states that she has had history of multiple kidney stones in the past.  She has had flank pain for the past several days.  She had gone to her primary care doctor on Monday however she continues to have pain more so on the left flank compared to the right.  She does report history of urinary tract infection in January that she completed antibiotics for.  She has not required kidney stone retrieval in the past.  No fevers.  No nausea.    Nursing Notes were all reviewed and agreed with or any disagreements were addressed in the HPI.    REVIEW OF SYSTEMS :      Review of Systems    Positives and Pertinent negatives as per HPI.     SURGICAL HISTORY     Past Surgical History:   Procedure Laterality Date    SINUS SURGERY      TONSILLECTOMY         CURRENTMEDICATIONS       Previous Medications    ALBUTEROL SULFATE  (90 BASE) MCG/ACT INHALER    Inhale 2 puffs into the lungs every 6 hours as needed for Wheezing    CETIRIZINE (ZYRTEC) 10 MG TABLET    Take 10 mg by mouth daily    DESVENLAFAXINE SUCCINATE (PRISTIQ) 50 MG TB24 EXTENDED RELEASE TABLET    Take 50 mg by mouth daily    FLUTICASONE (FLONASE) 50 MCG/ACT NASAL SPRAY    1 spray by Each Nare route daily    GABAPENTIN (NEURONTIN) 300 MG CAPSULE    Take 300 mg by mouth 2 times daily    LORAZEPAM (ATIVAN) 2 MG TABLET    Take 2 mg by mouth every 6 hours as needed for Anxiety..    METOCLOPRAMIDE (REGLAN) 10 MG TABLET    Take 10 mg by mouth 4 times daily     Course, and Reassessment:     38-year-old female presenting for evaluation of back pain, flank pain, in the setting of history of kidney stones.  Patient arrives with stable vital signs.  She appears uncomfortable secondary to flank pain.  She is afebrile.  There is no anterior abdominal tenderness.  Will obtain laboratory evaluation and ultimately CT of the abdomen pelvis for further evaluation of her symptoms.  She will be given Norco, Toradol and IV fluids, Zofran in the meantime    The differential diagnosis associated with the patient's presentation includes, but is not limited to: Kidney stone, urinary tract infection, obstructing stone, pyelonephritis, musculoskeletal pain, intra-abdominal infection    CONSULTS: (Who and What was discussed)  None    Discussion with Other Professionals : Admitting Team      Management of the patient was discussed with admitting physician.  Laboratory evaluation is overall unremarkable.  There is hematuria present.  There is no significant white blood cells in the urine.  CT abdomen pelvis shows punctate nephrolithiasis in the right kidney.  On reevaluation, patient remains in significant pain asking for more pain medicine.  She has been given 0.5 mg Dilaudid.  On reevaluation after this, patient reports that she is still in significant pain.  I have offered droperidol.    Patient states that she cannot go home due to this pain that is uncontrolled.  A specific explanation for the pain is not evident at this time.  As patients symptoms are still not controlled despite multiple IV and pain medicine, patient will be admitted for continued management and further investigation.    Social Determinants : None    Patient's care impacted by chronic condition(s):   Past Medical History:   Diagnosis Date    Allergic rhinitis     Anxiety     Asthma     Cervical pain (neck)     chronic    Depression     Dizziness     Fibromyalgia     Hearing loss     Kidney stone     Migraine     PTSD

## 2024-03-01 NOTE — PLAN OF CARE
Intractable flank pain, likely stone related given crystals in urine, however no obstruction visualized on CT. Non obstructing stones. Fluids, urology consult.

## 2024-03-01 NOTE — ED NOTES
Patient States that she does not want the droperidol, and informed that it is an antipsychotic. Pt informed that the medicine is used for many things, often given for pain, pt's phone ringing during conversation. Pt answers phone mid conversation  and mother states that this phone call is critical.

## 2024-03-02 ENCOUNTER — HOSPITAL ENCOUNTER (INPATIENT)
Age: 39
LOS: 1 days | Discharge: LEFT BEFORE DISCHARGE | DRG: 694 | End: 2024-03-02
Attending: EMERGENCY MEDICINE | Admitting: FAMILY MEDICINE
Payer: COMMERCIAL

## 2024-03-02 VITALS
WEIGHT: 120 LBS | OXYGEN SATURATION: 100 % | HEIGHT: 66 IN | HEART RATE: 96 BPM | RESPIRATION RATE: 18 BRPM | BODY MASS INDEX: 19.29 KG/M2 | TEMPERATURE: 98.1 F

## 2024-03-02 DIAGNOSIS — R31.9 HEMATURIA, UNSPECIFIED TYPE: ICD-10-CM

## 2024-03-02 DIAGNOSIS — R10.9 FLANK PAIN: ICD-10-CM

## 2024-03-02 DIAGNOSIS — E86.0 DEHYDRATION: Primary | ICD-10-CM

## 2024-03-02 DIAGNOSIS — R11.2 NAUSEA AND VOMITING, UNSPECIFIED VOMITING TYPE: ICD-10-CM

## 2024-03-02 DIAGNOSIS — R10.2 SUPRAPUBIC PAIN: ICD-10-CM

## 2024-03-02 DIAGNOSIS — F43.0 EMOTIONAL CRISIS, ACUTE REACTION TO STRESS: ICD-10-CM

## 2024-03-02 PROBLEM — N20.0 KIDNEY STONES: Status: ACTIVE | Noted: 2024-03-02

## 2024-03-02 LAB
ANION GAP SERPL CALCULATED.3IONS-SCNC: 13 MMOL/L (ref 3–16)
BACTERIA URNS QL MICRO: ABNORMAL /HPF
BASOPHILS # BLD: 0 K/UL (ref 0–0.2)
BASOPHILS NFR BLD: 0.4 %
BILIRUB UR QL STRIP.AUTO: NEGATIVE
BUN SERPL-MCNC: 11 MG/DL (ref 7–20)
CALCIUM SERPL-MCNC: 8.9 MG/DL (ref 8.3–10.6)
CHLORIDE SERPL-SCNC: 105 MMOL/L (ref 99–110)
CLARITY UR: CLEAR
CO2 SERPL-SCNC: 20 MMOL/L (ref 21–32)
COLOR UR: YELLOW
CREAT SERPL-MCNC: 0.7 MG/DL (ref 0.6–1.1)
DEPRECATED RDW RBC AUTO: 13.1 % (ref 12.4–15.4)
EOSINOPHIL # BLD: 0.2 K/UL (ref 0–0.6)
EOSINOPHIL NFR BLD: 3.1 %
EPI CELLS #/AREA URNS HPF: ABNORMAL /HPF (ref 0–5)
GFR SERPLBLD CREATININE-BSD FMLA CKD-EPI: >60 ML/MIN/{1.73_M2}
GLUCOSE SERPL-MCNC: 89 MG/DL (ref 70–99)
GLUCOSE UR STRIP.AUTO-MCNC: NEGATIVE MG/DL
HCT VFR BLD AUTO: 33.2 % (ref 36–48)
HGB BLD-MCNC: 11.4 G/DL (ref 12–16)
HGB UR QL STRIP.AUTO: ABNORMAL
KETONES UR STRIP.AUTO-MCNC: 15 MG/DL
LEUKOCYTE ESTERASE UR QL STRIP.AUTO: NEGATIVE
LYMPHOCYTES # BLD: 1.1 K/UL (ref 1–5.1)
LYMPHOCYTES NFR BLD: 17.2 %
MCH RBC QN AUTO: 31.3 PG (ref 26–34)
MCHC RBC AUTO-ENTMCNC: 34.4 G/DL (ref 31–36)
MCV RBC AUTO: 91 FL (ref 80–100)
MONOCYTES # BLD: 0.5 K/UL (ref 0–1.3)
MONOCYTES NFR BLD: 7.4 %
MUCOUS THREADS #/AREA URNS LPF: ABNORMAL /LPF
NEUTROPHILS # BLD: 4.8 K/UL (ref 1.7–7.7)
NEUTROPHILS NFR BLD: 71.9 %
NITRITE UR QL STRIP.AUTO: NEGATIVE
PH UR STRIP.AUTO: 6 [PH] (ref 5–8)
PLATELET # BLD AUTO: 244 K/UL (ref 135–450)
PMV BLD AUTO: 7.6 FL (ref 5–10.5)
POTASSIUM SERPL-SCNC: 4 MMOL/L (ref 3.5–5.1)
PROT UR STRIP.AUTO-MCNC: NEGATIVE MG/DL
RBC # BLD AUTO: 3.65 M/UL (ref 4–5.2)
RBC #/AREA URNS HPF: ABNORMAL /HPF (ref 0–4)
SODIUM SERPL-SCNC: 138 MMOL/L (ref 136–145)
SP GR UR STRIP.AUTO: >=1.03 (ref 1–1.03)
UA DIPSTICK W REFLEX MICRO PNL UR: YES
URN SPEC COLLECT METH UR: ABNORMAL
UROBILINOGEN UR STRIP-ACNC: 0.2 E.U./DL
WBC # BLD AUTO: 6.6 K/UL (ref 4–11)
WBC #/AREA URNS HPF: ABNORMAL /HPF (ref 0–5)

## 2024-03-02 PROCEDURE — 99284 EMERGENCY DEPT VISIT MOD MDM: CPT

## 2024-03-02 PROCEDURE — 1200000000 HC SEMI PRIVATE

## 2024-03-02 PROCEDURE — 87086 URINE CULTURE/COLONY COUNT: CPT

## 2024-03-02 PROCEDURE — 6370000000 HC RX 637 (ALT 250 FOR IP): Performed by: EMERGENCY MEDICINE

## 2024-03-02 PROCEDURE — 2580000003 HC RX 258: Performed by: EMERGENCY MEDICINE

## 2024-03-02 PROCEDURE — 81001 URINALYSIS AUTO W/SCOPE: CPT

## 2024-03-02 PROCEDURE — 80048 BASIC METABOLIC PNL TOTAL CA: CPT

## 2024-03-02 PROCEDURE — 96365 THER/PROPH/DIAG IV INF INIT: CPT

## 2024-03-02 PROCEDURE — 85025 COMPLETE CBC W/AUTO DIFF WBC: CPT

## 2024-03-02 PROCEDURE — 6360000002 HC RX W HCPCS: Performed by: EMERGENCY MEDICINE

## 2024-03-02 RX ORDER — HYDROCODONE BITARTRATE AND ACETAMINOPHEN 5; 325 MG/1; MG/1
1 TABLET ORAL EVERY 4 HOURS PRN
Status: DISCONTINUED | OUTPATIENT
Start: 2024-03-02 | End: 2024-03-02 | Stop reason: HOSPADM

## 2024-03-02 RX ORDER — POTASSIUM CHLORIDE 7.45 MG/ML
10 INJECTION INTRAVENOUS PRN
Status: DISCONTINUED | OUTPATIENT
Start: 2024-03-02 | End: 2024-03-02 | Stop reason: HOSPADM

## 2024-03-02 RX ORDER — PROMETHAZINE HYDROCHLORIDE 25 MG/ML
25 INJECTION, SOLUTION INTRAMUSCULAR; INTRAVENOUS EVERY 6 HOURS PRN
Status: DISCONTINUED | OUTPATIENT
Start: 2024-03-02 | End: 2024-03-02 | Stop reason: HOSPADM

## 2024-03-02 RX ORDER — ACETAMINOPHEN 650 MG/1
650 SUPPOSITORY RECTAL EVERY 6 HOURS PRN
Status: DISCONTINUED | OUTPATIENT
Start: 2024-03-02 | End: 2024-03-02 | Stop reason: HOSPADM

## 2024-03-02 RX ORDER — POLYETHYLENE GLYCOL 3350 17 G/17G
17 POWDER, FOR SOLUTION ORAL DAILY PRN
Status: DISCONTINUED | OUTPATIENT
Start: 2024-03-02 | End: 2024-03-02 | Stop reason: HOSPADM

## 2024-03-02 RX ORDER — ACETAMINOPHEN 325 MG/1
650 TABLET ORAL EVERY 6 HOURS PRN
Status: DISCONTINUED | OUTPATIENT
Start: 2024-03-02 | End: 2024-03-02 | Stop reason: HOSPADM

## 2024-03-02 RX ORDER — MAGNESIUM SULFATE IN WATER 40 MG/ML
2000 INJECTION, SOLUTION INTRAVENOUS PRN
Status: DISCONTINUED | OUTPATIENT
Start: 2024-03-02 | End: 2024-03-02 | Stop reason: HOSPADM

## 2024-03-02 RX ORDER — DROPERIDOL 2.5 MG/ML
1.25 INJECTION, SOLUTION INTRAMUSCULAR; INTRAVENOUS ONCE
Status: DISCONTINUED | OUTPATIENT
Start: 2024-03-02 | End: 2024-03-02 | Stop reason: HOSPADM

## 2024-03-02 RX ORDER — SODIUM CHLORIDE 0.9 % (FLUSH) 0.9 %
5-40 SYRINGE (ML) INJECTION EVERY 12 HOURS SCHEDULED
Status: DISCONTINUED | OUTPATIENT
Start: 2024-03-02 | End: 2024-03-02 | Stop reason: HOSPADM

## 2024-03-02 RX ORDER — SODIUM CHLORIDE 0.9 % (FLUSH) 0.9 %
5-40 SYRINGE (ML) INJECTION PRN
Status: DISCONTINUED | OUTPATIENT
Start: 2024-03-02 | End: 2024-03-02 | Stop reason: HOSPADM

## 2024-03-02 RX ORDER — KETOROLAC TROMETHAMINE 30 MG/ML
30 INJECTION, SOLUTION INTRAMUSCULAR; INTRAVENOUS EVERY 6 HOURS PRN
Status: DISCONTINUED | OUTPATIENT
Start: 2024-03-02 | End: 2024-03-02 | Stop reason: HOSPADM

## 2024-03-02 RX ORDER — POTASSIUM CHLORIDE 20 MEQ/1
40 TABLET, EXTENDED RELEASE ORAL PRN
Status: DISCONTINUED | OUTPATIENT
Start: 2024-03-02 | End: 2024-03-02 | Stop reason: HOSPADM

## 2024-03-02 RX ORDER — HYDROCODONE BITARTRATE AND ACETAMINOPHEN 5; 325 MG/1; MG/1
2 TABLET ORAL ONCE
Status: DISCONTINUED | OUTPATIENT
Start: 2024-03-02 | End: 2024-03-02 | Stop reason: HOSPADM

## 2024-03-02 RX ORDER — SODIUM CHLORIDE 9 MG/ML
INJECTION, SOLUTION INTRAVENOUS CONTINUOUS
Status: DISCONTINUED | OUTPATIENT
Start: 2024-03-02 | End: 2024-03-02 | Stop reason: HOSPADM

## 2024-03-02 RX ORDER — SODIUM CHLORIDE, SODIUM LACTATE, POTASSIUM CHLORIDE, AND CALCIUM CHLORIDE .6; .31; .03; .02 G/100ML; G/100ML; G/100ML; G/100ML
1000 INJECTION, SOLUTION INTRAVENOUS ONCE
Status: COMPLETED | OUTPATIENT
Start: 2024-03-02 | End: 2024-03-02

## 2024-03-02 RX ORDER — ENOXAPARIN SODIUM 100 MG/ML
40 INJECTION SUBCUTANEOUS NIGHTLY
Status: DISCONTINUED | OUTPATIENT
Start: 2024-03-02 | End: 2024-03-02 | Stop reason: HOSPADM

## 2024-03-02 RX ORDER — SODIUM CHLORIDE 9 MG/ML
INJECTION, SOLUTION INTRAVENOUS PRN
Status: DISCONTINUED | OUTPATIENT
Start: 2024-03-02 | End: 2024-03-02 | Stop reason: HOSPADM

## 2024-03-02 RX ORDER — LORAZEPAM 1 MG/1
1 TABLET ORAL ONCE
Status: DISCONTINUED | OUTPATIENT
Start: 2024-03-02 | End: 2024-03-02 | Stop reason: HOSPADM

## 2024-03-02 RX ORDER — ONDANSETRON 4 MG/1
4 TABLET, ORALLY DISINTEGRATING ORAL ONCE
Status: DISCONTINUED | OUTPATIENT
Start: 2024-03-02 | End: 2024-03-02 | Stop reason: HOSPADM

## 2024-03-02 RX ORDER — KETOROLAC TROMETHAMINE 30 MG/ML
15 INJECTION, SOLUTION INTRAMUSCULAR; INTRAVENOUS ONCE
Status: DISCONTINUED | OUTPATIENT
Start: 2024-03-02 | End: 2024-03-02 | Stop reason: HOSPADM

## 2024-03-02 RX ADMIN — SODIUM CHLORIDE, POTASSIUM CHLORIDE, SODIUM LACTATE AND CALCIUM CHLORIDE 1000 ML: 600; 310; 30; 20 INJECTION, SOLUTION INTRAVENOUS at 17:59

## 2024-03-02 RX ADMIN — CEFTRIAXONE 1000 MG: 1 INJECTION, POWDER, FOR SOLUTION INTRAMUSCULAR; INTRAVENOUS at 17:58

## 2024-03-02 ASSESSMENT — PAIN - FUNCTIONAL ASSESSMENT: PAIN_FUNCTIONAL_ASSESSMENT: NONE - DENIES PAIN

## 2024-03-02 NOTE — ED NOTES
Patient out of room yelling that she must have fluids now.  Patient also wants to take her home medications that her mother has.  Patient refuses to stay in her room and is pacing around the ED.  Patient attempted to leave with IV in arm.  Patient states that \"This nurse was refusing care\"   \"I want a new nurse because of inappropriate safety behaviors.\"    Multiple staff  members are trying to calm patient down.  Dr Bernal in room speaking with patient.     Nancy Rossi, YAHIR  03/02/24 [id number]

## 2024-03-02 NOTE — ED NOTES
Pt would not sign out AMA because her ins will not cover. Pt givend/c instructions with return verbalization. Emphasis on f/u, to return with worsening s/s. Pt ambulated to lobby with steady gait.

## 2024-03-03 LAB — BACTERIA UR CULT: NORMAL

## 2024-03-03 NOTE — ED NOTES
Patient refused to follow hospital policy and left ED.  Mother contacted about AMA and will pick patient up.  Patient sitting outside of ED and called 911.  Officers in ED speaking with Dr Paz.     Nancy Rossi, RN  03/02/24 1101

## 2024-03-03 NOTE — ED NOTES
Patient refusing all vitals.  Patient continues to stand at nursing station and yell.  Patient will not follow instructions.  Patient will not stay in her room.  All patient's in [place] are asking for their doors to be closed because of the constant screaming.  Charge and Dr's involved in situation.     Nancy Rossi, YAHIR  03/02/24 9816

## 2024-03-03 NOTE — PROGRESS NOTES
Pt was emotionally distressed, concerned about her mother, and having difficulty deciding on course of treatment. Pt ultimately decided to leave AMA.     Brittany Farnsworth   Intern     03/02/24 2043   Encounter Summary   Encounter Overview/Reason  Crisis   Service Provided For: Patient   Referral/Consult From: Other (comment)  (paged to come to ED -[facility])   Support System Parent   Last Encounter  03/02/24   Complexity of Encounter High   Begin Time 1945   End Time  2044   Total Time Calculated 59 min   Crisis   Type Emotional distress   Spiritual/Emotional needs   Type Emotional Distress   Assessment/Intervention/Outcome   Assessment Angry;Anxious;Compromised coping;Concerns with suffering;Impaired social interaction;Interrupted family processes;Stress overload;Tearful   Intervention Active listening;Confronted/Challenged;Explored/Affirmed feelings, thoughts, concerns;Sustaining Presence/Ministry of presence   Outcome Did not respond;Refused/Declined;Venting emotion

## 2024-03-03 NOTE — PLAN OF CARE
Received call from ER to admit patient  Placed orders  Patient decided to leave AMA and I was not notified to the patient already left AMA

## 2024-03-03 NOTE — ED NOTES
Patient continues to scream out for help.  Dr Bernal in the room trying to calm patient.  After Dr Bernal left, patient started asking for help stating that Dr Bernal left the room without assisting her.     Nancy Rossi RN  03/02/24 1931

## 2024-03-03 NOTE — ED PROVIDER NOTES
she can go up to her room.  Patient then asked for her mother who she apparently had kicked out of the room earlier in the evening.  Patient's mother had left the hospital and the patient became irate with this, stating that the patient's mother had no right to leave the hospital without telling her.  We reminded the patient that both she and her mother are adults and can make decisions for themselves and her mother would not be able to stay at the hospital with her in the inpatient setting anyway.  As I was going to evaluate a new patient who had come to the emergency department, patient did leave the emergency department on her own volition.     Alessandro Paz MD  03/02/24 1706    
requested \"a mild opiate.\"    Her labs today are again similarly unremarkable to yesterday, although still with abnormal findings in her urinalysis.  It is noted that she has significant less hematuria today, although if more white blood cells.  There is not sufficient evidence to clearly diagnose a urinary tract infection, and CT from yesterday showed only punctate intrarenal stones which seem unlikely to be the source of her symptoms.  However, I do think she may need further urology evaluation, she appears to have recurrent episodes of significant pain and hematuria, potentially with negative cultures, that are as yet unexplained.  It also appears that she is experiencing significant symptoms and difficulty managing relating to her social stressors, which is going to significantly complicate an attempt at outpatient evaluation and management of her symptoms.    The patient's case was discussed with the hospitalist, and the plan at this time is for admission.    Clinical Impression     1. Dehydration    2. Flank pain    3. Suprapubic pain    4. Hematuria, unspecified type    5. Nausea and vomiting, unspecified vomiting type    6. Emotional crisis, acute reaction to stress        Disposition     PATIENT REFERRED TO:  No follow-up provider specified.    DISCHARGE MEDICATIONS:  New Prescriptions    No medications on file       DISPOSITION Decision To Admit    (Please note that portions of this note were completed with voice recognition software.  Efforts were made to edit the dictations but occasionally words are mis-transcribed.)     Veronica Bernal MD  03/02/24 2009

## 2024-03-03 NOTE — ED NOTES
Patient upset because mother and grandmother will not answer their phones.  Patient demanding that we go hunt for her mother on all floors of the hospital.  Patient requested .  When  arrived she didn't want to speak with her.  Patient demanding IV be removed and a room assigned immediately.     Nancy Rossi RN  03/02/24 6538

## 2024-03-03 NOTE — ED NOTES
Patient continues to yell out.  Patient cursed her mother out and told her to leave.  Mother and patient currently screaming at each other.  Mother left ED.     Nancy Rossi RN  03/02/24 1929

## 2024-06-13 ENCOUNTER — HOSPITAL ENCOUNTER (EMERGENCY)
Age: 39
Discharge: HOME | End: 2024-06-13
Attending: EMERGENCY MEDICINE
Payer: COMMERCIAL

## 2024-06-13 VITALS
RESPIRATION RATE: 18 BRPM | HEIGHT: 66 IN | OXYGEN SATURATION: 100 % | TEMPERATURE: 98.3 F | WEIGHT: 112.88 LBS | HEART RATE: 87 BPM | DIASTOLIC BLOOD PRESSURE: 66 MMHG | SYSTOLIC BLOOD PRESSURE: 122 MMHG | BODY MASS INDEX: 18.14 KG/M2

## 2024-06-13 DIAGNOSIS — R51.9 ACUTE NONINTRACTABLE HEADACHE, UNSPECIFIED HEADACHE TYPE: Primary | ICD-10-CM

## 2024-06-13 DIAGNOSIS — R11.0 NAUSEA: ICD-10-CM

## 2024-06-13 LAB
FLUAV RNA UPPER RESP QL NAA+PROBE: NEGATIVE
FLUBV AG NPH QL: NEGATIVE
SARS-COV-2 RDRP RESP QL NAA+PROBE: NOT DETECTED

## 2024-06-13 PROCEDURE — 87804 INFLUENZA ASSAY W/OPTIC: CPT

## 2024-06-13 PROCEDURE — 99283 EMERGENCY DEPT VISIT LOW MDM: CPT

## 2024-06-13 PROCEDURE — 87635 SARS-COV-2 COVID-19 AMP PRB: CPT

## 2024-06-13 RX ORDER — METOCLOPRAMIDE 10 MG/1
10 TABLET ORAL 3 TIMES DAILY PRN
Qty: 15 TABLET | Refills: 0 | Status: SHIPPED | OUTPATIENT
Start: 2024-06-13 | End: 2024-06-18

## 2024-06-13 NOTE — ED NOTES
Pt given d/c instructions with return verbalization including medication. Pt could not wait for test results so she will check them on My Chart. Aware she will be called with + results. To return with worsening s/s. Pt has to leave due to dog issues. Pt ambulated to lobby with steady gait.

## 2024-06-13 NOTE — ED PROVIDER NOTES
[facility] EMERGENCY DEPARTMENT  EMERGENCY DEPARTMENT ENCOUNTER        Pt Name: Priti Louise  MRN: [id number]  Birthdate 1985  Date of evaluation: 6/13/2024  Provider: Mahin Patton MD  PCP: Allison Cuellar MD      CHIEF COMPLAINT       Chief Complaint   Patient presents with    Fall     Fell down stairs on Sunday     Headache       HISTORY OFPRESENT ILLNESS   (Location/Symptom, Timing/Onset, Context/Setting, Quality, Duration, Modifying Factors,Severity)  Note limiting factors.     Priti Louise is a 39 y.o. female presenting today due to concern for tripping and falling down a couple of steps on Sunday which was 4 days ago and subsequently hitting her head and initially feeling fine on Monday and Tuesday but then yesterday on Wednesday she woke up with a headache along with nausea and has been having trouble sleeping ever since.  She denies any vomiting.  She does have some congestion and a sore throat along with bodyaches and chills.  She denies any chest pain or shortness of breath.  She reports subjective fever but did not take her temperature.  She does report some right hip pain along with some left knee discomfort since the fall but is able to walk without any issues.  She does feel lightheaded and dizzy since yesterday.  Due to concern for fall with head trauma although not developing symptoms until yesterday morning, she came to the emergency department since she was concerned she may have had a concussion.  She also was worried that maybe the Abilify that she was taking was causing her trouble with sleeping and therefore did ask her doctor about this.  She denies any suicidal thoughts.  She does have a history of depression.        REVIEW OF SYSTEMS    (2-9 systems for level 4, 10 or more for level 5)     Review of Systems   Constitutional:  Positive for chills, diaphoresis (intermittent sweating since yesterday), fatigue and fever (subjective).   HENT:  Positive for congestion and  Allison Quezada MD  [address]  [address]  [facility] [address] [phone]    In 3 days  For repeat evaluation      DISCHARGEMEDICATIONS:  Discharge Medication List as of 6/13/2024  4:11 PM        START taking these medications    Details   !! metoclopramide (REGLAN) 10 MG tablet Take 1 tablet by mouth 3 times daily as needed (vomiting/nausea), Disp-15 tablet, R-0Print       !! - Potential duplicate medications found. Please discuss with provider.          DISCONTINUED MEDICATIONS:  Discharge Medication List as of 6/13/2024  4:11 PM                 (Please note that portions of this note were completed with a voicerecognition program.  Efforts were made to edit the dictations but occasionally words are mis-transcribed.)    Mahin Patton MD (electronically signed)           Mahin Patton MD  06/14/24 1544       Mahin Patton MD  06/14/24 1549

## 2024-06-14 ASSESSMENT — ENCOUNTER SYMPTOMS
NAUSEA: 1
ABDOMINAL PAIN: 0
SORE THROAT: 1
CHEST TIGHTNESS: 0
SHORTNESS OF BREATH: 0
COUGH: 1
VOMITING: 0
EYE PAIN: 0

## 2024-12-22 ENCOUNTER — HOSPITAL ENCOUNTER (EMERGENCY)
Age: 39
Discharge: HOME | End: 2024-12-22
Attending: EMERGENCY MEDICINE
Payer: COMMERCIAL

## 2024-12-22 VITALS
DIASTOLIC BLOOD PRESSURE: 74 MMHG | SYSTOLIC BLOOD PRESSURE: 119 MMHG | OXYGEN SATURATION: 99 % | RESPIRATION RATE: 14 BRPM | TEMPERATURE: 97.7 F | HEART RATE: 104 BPM

## 2024-12-22 DIAGNOSIS — M25.512 ACUTE PAIN OF LEFT SHOULDER: ICD-10-CM

## 2024-12-22 DIAGNOSIS — M54.12 CERVICAL RADICULOPATHY: Primary | ICD-10-CM

## 2024-12-22 PROCEDURE — 99284 EMERGENCY DEPT VISIT MOD MDM: CPT

## 2024-12-22 PROCEDURE — 6360000002 HC RX W HCPCS: Performed by: EMERGENCY MEDICINE

## 2024-12-22 PROCEDURE — 2500000003 HC RX 250 WO HCPCS: Performed by: EMERGENCY MEDICINE

## 2024-12-22 PROCEDURE — 96372 THER/PROPH/DIAG INJ SC/IM: CPT

## 2024-12-22 PROCEDURE — 6370000000 HC RX 637 (ALT 250 FOR IP): Performed by: EMERGENCY MEDICINE

## 2024-12-22 RX ORDER — METHOCARBAMOL 750 MG/1
750 TABLET, FILM COATED ORAL 4 TIMES DAILY
Qty: 20 TABLET | Refills: 0 | Status: SHIPPED | OUTPATIENT
Start: 2024-12-22 | End: 2024-12-27

## 2024-12-22 RX ORDER — KETOROLAC TROMETHAMINE 10 MG/1
10 TABLET, FILM COATED ORAL EVERY 6 HOURS PRN
Qty: 12 TABLET | Refills: 0 | Status: SHIPPED | OUTPATIENT
Start: 2024-12-22 | End: 2024-12-25

## 2024-12-22 RX ORDER — LIDOCAINE 50 MG/G
1 PATCH TOPICAL DAILY
Qty: 30 PATCH | Refills: 0 | Status: SHIPPED | OUTPATIENT
Start: 2024-12-22 | End: 2025-01-21

## 2024-12-22 RX ORDER — METHOCARBAMOL 500 MG/1
1000 TABLET, FILM COATED ORAL ONCE
Status: COMPLETED | OUTPATIENT
Start: 2024-12-22 | End: 2024-12-22

## 2024-12-22 RX ORDER — DICLOFENAC POTASSIUM 25 MG/1
75 TABLET, FILM COATED ORAL 2 TIMES DAILY
COMMUNITY

## 2024-12-22 RX ORDER — GABAPENTIN 100 MG/1
300 CAPSULE ORAL 3 TIMES DAILY
Status: DISCONTINUED | OUTPATIENT
Start: 2024-12-22 | End: 2024-12-22 | Stop reason: HOSPADM

## 2024-12-22 RX ORDER — KETOROLAC TROMETHAMINE 30 MG/ML
30 INJECTION, SOLUTION INTRAMUSCULAR; INTRAVENOUS ONCE
Status: COMPLETED | OUTPATIENT
Start: 2024-12-22 | End: 2024-12-22

## 2024-12-22 RX ORDER — CYCLOBENZAPRINE HCL 10 MG
10 TABLET ORAL 3 TIMES DAILY PRN
COMMUNITY

## 2024-12-22 RX ORDER — LIDOCAINE 4 G/G
1 PATCH TOPICAL DAILY
Status: DISCONTINUED | OUTPATIENT
Start: 2024-12-22 | End: 2024-12-22 | Stop reason: HOSPADM

## 2024-12-22 RX ORDER — OXYCODONE AND ACETAMINOPHEN 5; 325 MG/1; MG/1
1 TABLET ORAL ONCE
Status: COMPLETED | OUTPATIENT
Start: 2024-12-22 | End: 2024-12-22

## 2024-12-22 RX ORDER — ACETAMINOPHEN 325 MG/1
650 TABLET ORAL ONCE
Status: COMPLETED | OUTPATIENT
Start: 2024-12-22 | End: 2024-12-22

## 2024-12-22 RX ADMIN — METHOCARBAMOL 1000 MG: 500 TABLET ORAL at 14:27

## 2024-12-22 RX ADMIN — GABAPENTIN 300 MG: 100 CAPSULE ORAL at 14:26

## 2024-12-22 RX ADMIN — OXYCODONE HYDROCHLORIDE AND ACETAMINOPHEN 1 TABLET: 5; 325 TABLET ORAL at 14:57

## 2024-12-22 RX ADMIN — ACETAMINOPHEN 650 MG: 325 TABLET ORAL at 14:26

## 2024-12-22 RX ADMIN — METHYLPREDNISOLONE SODIUM SUCCINATE 125 MG: 125 INJECTION INTRAMUSCULAR; INTRAVENOUS at 14:57

## 2024-12-22 RX ADMIN — KETOROLAC TROMETHAMINE 30 MG: 30 INJECTION INTRAMUSCULAR; INTRAVENOUS at 14:27

## 2024-12-22 ASSESSMENT — PAIN DESCRIPTION - DESCRIPTORS
DESCRIPTORS: ACHING
DESCRIPTORS: ACHING

## 2024-12-22 ASSESSMENT — PAIN DESCRIPTION - ORIENTATION
ORIENTATION: LEFT
ORIENTATION: LEFT

## 2024-12-22 ASSESSMENT — PAIN SCALES - GENERAL
PAINLEVEL_OUTOF10: 7
PAINLEVEL_OUTOF10: 7

## 2024-12-22 ASSESSMENT — PAIN DESCRIPTION - LOCATION
LOCATION: NECK
LOCATION: NECK;SHOULDER

## 2024-12-22 NOTE — ED PROVIDER NOTES
Emergency Department Attending Physician Note  Location: [facility] EMERGENCY DEPARTMENT  12/22/2024       Pt Name: Priti Louise  MRN: [id number]  Birthdate 1985    Date of evaluation: 12/22/2024  Provider: FIDENCIO DUKES DO  PCP: Allison Cuellar MD    Note Started: 2:17 PM EST 12/22/24    CHIEF COMPLAINT  Chief Complaint   Patient presents with    Neck Pain    Shoulder Pain       ROOM: 7    HISTORY OF PRESENT ILLNESS:  History obtained by patient. Limitations to history : None.     Priti Louise is a 39 y.o. female with a significant PMHx of anxiety, asthma, previous chronic neck pain, documented in her chart, fibromyalgia, PTSD, and other comorbidities as listed below, here in the emergency department today with what she describes as left-sided neck pain that radiates down to her top of her shoulder, ever since she turned her head yesterday while in the car.  She is turned around to look at her 5-year-old daughter, and immediately felt a burning shooting pain down her shoulder.  Right now, here in the ER, she said she took diclofenac and tizanidine, previously prescribed to her, and it took the edge off, but says the pain came back afterwards, so she came to the ER.  No direct trauma, falls to the ground, or numbness, weakness, tingling.  She says the pain is worse when she moves her shoulder like reaching out in front of her, or raising her arm to the side.  Complete rest she said the pain is tolerable.  Moving that shoulder makes the pain worse.  She never fell to the ground.  Denies any other concerns including any swelling, rashes, bruising.  No vision changes, headaches.  No back pain.  No other concerns here in the ED.  It looks like patient already has a prescription for gabapentin and Voltaren, but she said she did not pick those up yet.Patient tells me ultimately that her biggest concern is that she supposed to fly on an airplane tomorrow to go to [place], she does want to be in pain

## 2025-05-28 ENCOUNTER — APPOINTMENT (OUTPATIENT)
Dept: CT IMAGING | Age: 40
End: 2025-05-28
Payer: COMMERCIAL

## 2025-05-28 ENCOUNTER — HOSPITAL ENCOUNTER (INPATIENT)
Age: 40
LOS: 3 days | Discharge: HOME | End: 2025-05-31
Attending: EMERGENCY MEDICINE | Admitting: INTERNAL MEDICINE
Payer: COMMERCIAL

## 2025-05-28 DIAGNOSIS — K56.7 ILEUS (HCC): Primary | ICD-10-CM

## 2025-05-28 LAB
ALBUMIN SERPL-MCNC: 4.4 G/DL (ref 3.4–5)
ALBUMIN/GLOB SERPL: 1.6 {RATIO} (ref 1.1–2.2)
ALP SERPL-CCNC: 45 U/L (ref 40–129)
ALT SERPL-CCNC: 11 U/L (ref 10–40)
ANION GAP SERPL CALCULATED.3IONS-SCNC: 8 MMOL/L (ref 3–16)
AST SERPL-CCNC: 22 U/L (ref 15–37)
BASOPHILS # BLD: 0.1 K/UL (ref 0–0.2)
BASOPHILS NFR BLD: 1.1 %
BILIRUB SERPL-MCNC: 0.3 MG/DL (ref 0–1)
BILIRUB UR QL STRIP.AUTO: NEGATIVE
BUN SERPL-MCNC: 12 MG/DL (ref 7–20)
CALCIUM SERPL-MCNC: 9.2 MG/DL (ref 8.3–10.6)
CHLORIDE SERPL-SCNC: 105 MMOL/L (ref 99–110)
CLARITY UR: CLEAR
CO2 SERPL-SCNC: 25 MMOL/L (ref 21–32)
COLOR UR: YELLOW
CREAT SERPL-MCNC: 0.8 MG/DL (ref 0.6–1.1)
DEPRECATED RDW RBC AUTO: 12.9 % (ref 12.4–15.4)
EOSINOPHIL # BLD: 0.2 K/UL (ref 0–0.6)
EOSINOPHIL NFR BLD: 2.8 %
GFR SERPLBLD CREATININE-BSD FMLA CKD-EPI: >90 ML/MIN/{1.73_M2}
GLUCOSE SERPL-MCNC: 101 MG/DL (ref 70–99)
GLUCOSE UR STRIP.AUTO-MCNC: NEGATIVE MG/DL
HCG UR QL: NEGATIVE
HCT VFR BLD AUTO: 37.9 % (ref 36–48)
HGB BLD-MCNC: 12.9 G/DL (ref 12–16)
HGB UR QL STRIP.AUTO: NEGATIVE
KETONES UR STRIP.AUTO-MCNC: ABNORMAL MG/DL
LACTATE BLDV-SCNC: 0.4 MMOL/L (ref 0.4–2)
LEUKOCYTE ESTERASE UR QL STRIP.AUTO: NEGATIVE
LIPASE SERPL-CCNC: 39 U/L (ref 13–60)
LYMPHOCYTES # BLD: 1.6 K/UL (ref 1–5.1)
LYMPHOCYTES NFR BLD: 20.8 %
MCH RBC QN AUTO: 32 PG (ref 26–34)
MCHC RBC AUTO-ENTMCNC: 33.9 G/DL (ref 31–36)
MCV RBC AUTO: 94.3 FL (ref 80–100)
MONOCYTES # BLD: 0.6 K/UL (ref 0–1.3)
MONOCYTES NFR BLD: 7.9 %
NEUTROPHILS # BLD: 5 K/UL (ref 1.7–7.7)
NEUTROPHILS NFR BLD: 67.4 %
NITRITE UR QL STRIP.AUTO: NEGATIVE
PH UR STRIP.AUTO: 5.5 [PH] (ref 5–8)
PLATELET # BLD AUTO: 257 K/UL (ref 135–450)
PMV BLD AUTO: 7.8 FL (ref 5–10.5)
POTASSIUM SERPL-SCNC: 4.1 MMOL/L (ref 3.5–5.1)
PROT SERPL-MCNC: 7.2 G/DL (ref 6.4–8.2)
PROT UR STRIP.AUTO-MCNC: NEGATIVE MG/DL
RBC # BLD AUTO: 4.02 M/UL (ref 4–5.2)
SODIUM SERPL-SCNC: 138 MMOL/L (ref 136–145)
SP GR UR STRIP.AUTO: 1.02 (ref 1–1.03)
UA COMPLETE W REFLEX CULTURE PNL UR: ABNORMAL
UA DIPSTICK W REFLEX MICRO PNL UR: ABNORMAL
URN SPEC COLLECT METH UR: ABNORMAL
UROBILINOGEN UR STRIP-ACNC: 0.2 E.U./DL
WBC # BLD AUTO: 7.5 K/UL (ref 4–11)

## 2025-05-28 PROCEDURE — 36415 COLL VENOUS BLD VENIPUNCTURE: CPT

## 2025-05-28 PROCEDURE — 83690 ASSAY OF LIPASE: CPT

## 2025-05-28 PROCEDURE — 80053 COMPREHEN METABOLIC PANEL: CPT

## 2025-05-28 PROCEDURE — 1200000000 HC SEMI PRIVATE

## 2025-05-28 PROCEDURE — 6360000004 HC RX CONTRAST MEDICATION: Performed by: EMERGENCY MEDICINE

## 2025-05-28 PROCEDURE — 74177 CT ABD & PELVIS W/CONTRAST: CPT

## 2025-05-28 PROCEDURE — 2580000003 HC RX 258

## 2025-05-28 PROCEDURE — 99285 EMERGENCY DEPT VISIT HI MDM: CPT

## 2025-05-28 PROCEDURE — 84703 CHORIONIC GONADOTROPIN ASSAY: CPT

## 2025-05-28 PROCEDURE — 96374 THER/PROPH/DIAG INJ IV PUSH: CPT

## 2025-05-28 PROCEDURE — 2580000003 HC RX 258: Performed by: EMERGENCY MEDICINE

## 2025-05-28 PROCEDURE — 83605 ASSAY OF LACTIC ACID: CPT

## 2025-05-28 PROCEDURE — 81003 URINALYSIS AUTO W/O SCOPE: CPT

## 2025-05-28 PROCEDURE — 6360000002 HC RX W HCPCS: Performed by: EMERGENCY MEDICINE

## 2025-05-28 PROCEDURE — 85025 COMPLETE CBC W/AUTO DIFF WBC: CPT

## 2025-05-28 RX ORDER — ARMODAFINIL 250 MG/1
250 TABLET ORAL DAILY
COMMUNITY

## 2025-05-28 RX ORDER — FEXOFENADINE HCL 180 MG/1
180 TABLET ORAL DAILY
COMMUNITY

## 2025-05-28 RX ORDER — SODIUM CHLORIDE, SODIUM LACTATE, POTASSIUM CHLORIDE, CALCIUM CHLORIDE 600; 310; 30; 20 MG/100ML; MG/100ML; MG/100ML; MG/100ML
INJECTION, SOLUTION INTRAVENOUS CONTINUOUS
Status: DISCONTINUED | OUTPATIENT
Start: 2025-05-28 | End: 2025-05-31

## 2025-05-28 RX ORDER — SODIUM CHLORIDE 9 MG/ML
1000 INJECTION, SOLUTION INTRAVENOUS CONTINUOUS
Status: DISCONTINUED | OUTPATIENT
Start: 2025-05-28 | End: 2025-05-29 | Stop reason: HOSPADM

## 2025-05-28 RX ORDER — ARIPIPRAZOLE 10 MG/1
10 TABLET ORAL DAILY
COMMUNITY

## 2025-05-28 RX ORDER — ONDANSETRON 2 MG/ML
4 INJECTION INTRAMUSCULAR; INTRAVENOUS ONCE
Status: COMPLETED | OUTPATIENT
Start: 2025-05-28 | End: 2025-05-28

## 2025-05-28 RX ORDER — MIDAZOLAM HYDROCHLORIDE 1 MG/ML
2 INJECTION, SOLUTION INTRAMUSCULAR; INTRAVENOUS ONCE
Status: DISCONTINUED | OUTPATIENT
Start: 2025-05-28 | End: 2025-05-28

## 2025-05-28 RX ORDER — IOPAMIDOL 755 MG/ML
75 INJECTION, SOLUTION INTRAVASCULAR
Status: COMPLETED | OUTPATIENT
Start: 2025-05-28 | End: 2025-05-28

## 2025-05-28 RX ADMIN — SODIUM CHLORIDE 1000 ML: 0.9 INJECTION, SOLUTION INTRAVENOUS at 22:14

## 2025-05-28 RX ADMIN — SODIUM CHLORIDE, SODIUM LACTATE, POTASSIUM CHLORIDE, AND CALCIUM CHLORIDE: .6; .31; .03; .02 INJECTION, SOLUTION INTRAVENOUS at 23:44

## 2025-05-28 RX ADMIN — IOPAMIDOL 75 ML: 755 INJECTION, SOLUTION INTRAVENOUS at 19:45

## 2025-05-28 RX ADMIN — ONDANSETRON 4 MG: 2 INJECTION, SOLUTION INTRAMUSCULAR; INTRAVENOUS at 22:10

## 2025-05-28 ASSESSMENT — PAIN DESCRIPTION - LOCATION: LOCATION: ABDOMEN;BACK

## 2025-05-28 ASSESSMENT — PAIN - FUNCTIONAL ASSESSMENT
PAIN_FUNCTIONAL_ASSESSMENT: 0-10
PAIN_FUNCTIONAL_ASSESSMENT: PREVENTS OR INTERFERES SOME ACTIVE ACTIVITIES AND ADLS

## 2025-05-28 ASSESSMENT — PAIN DESCRIPTION - ONSET: ONSET: ON-GOING

## 2025-05-28 ASSESSMENT — PAIN DESCRIPTION - FREQUENCY: FREQUENCY: CONTINUOUS

## 2025-05-28 ASSESSMENT — PAIN DESCRIPTION - PAIN TYPE: TYPE: ACUTE PAIN

## 2025-05-28 ASSESSMENT — LIFESTYLE VARIABLES
HOW OFTEN DO YOU HAVE A DRINK CONTAINING ALCOHOL: MONTHLY OR LESS
HOW MANY STANDARD DRINKS CONTAINING ALCOHOL DO YOU HAVE ON A TYPICAL DAY: 1 OR 2

## 2025-05-28 ASSESSMENT — PAIN DESCRIPTION - DESCRIPTORS: DESCRIPTORS: DISCOMFORT

## 2025-05-28 ASSESSMENT — PAIN SCALES - GENERAL: PAINLEVEL_OUTOF10: 7

## 2025-05-28 NOTE — ED PROVIDER NOTES
Emergency Department Encounter    Patient: Priti Louise  MRN: [id number]  : 1985  Date of Evaluation: 2025  ED Provider:  SHILA DUPONT MD    Triage Chief Complaint:   Abdominal Pain (Pt came to the ED after being sent from urgent care to get a CT due to a kidney issue. Abd is extended and uncomfortable)    [place]:  Priti Louise is a 40 y.o. female that presents to the ER for evaluation of diffuse abdominal pain and distention.  She has prior history of  medullary sponge kidney.  Diffuse abdominal distention, no active fluid wave.  No prior history of bowel obstruction.  Marked increasing distention and pain.    ROS - see HPI, below listed is current ROS at time of my eval:  General:  No fevers, no chills, no weakness  Eyes:  no discharge  ENT:  No sore throat, no nasal congestion  Cardiovascular:  No chest pain, no palpitations  Respiratory:  No shortness of breath, no cough, no wheezing  Gastrointestinal:  + pain, + nausea, no vomiting, no diarrhea  Musculoskeletal:  No muscle pain, no joint pain  Skin:  No rash, no pruritis  Neurologic:  no headache  Genitourinary:  No dysuria, no hematuria  Endocrine:  No unexpected weight gain, no unexpected weight loss  Extremities:  no edema, no pain    Past Medical History:   Diagnosis Date    Allergic rhinitis     Anxiety     Asthma     Cervical pain (neck)     chronic    Depression     Dizziness     Fibromyalgia     Hearing loss     Kidney stone     Migraine     PTSD (post-traumatic stress disorder)     Seizures (HCC)     Sinusitis      Past Surgical History:   Procedure Laterality Date    SINUS SURGERY      TONSILLECTOMY       Family History   Problem Relation Age of Onset    Cancer Mother     No Known Problems Father     No Known Problems Maternal Grandmother     No Known Problems Maternal Grandfather     No Known Problems Paternal Grandmother     No Known Problems Paternal Grandfather      Social History     Socioeconomic History        Hematocrit 37.9 36.0 - 48.0 %    MCV 94.3 80.0 - 100.0 fL    MCH 32.0 26.0 - 34.0 pg    MCHC 33.9 31.0 - 36.0 g/dL    RDW 12.9 12.4 - 15.4 %    Platelets 257 135 - 450 K/uL    MPV 7.8 5.0 - 10.5 fL    Neutrophils % 67.4 %    Lymphocytes % 20.8 %    Monocytes % 7.9 %    Eosinophils % 2.8 %    Basophils % 1.1 %    Neutrophils Absolute 5.0 1.7 - 7.7 K/uL    Lymphocytes Absolute 1.6 1.0 - 5.1 K/uL    Monocytes Absolute 0.6 0.0 - 1.3 K/uL    Eosinophils Absolute 0.2 0.0 - 0.6 K/uL    Basophils Absolute 0.1 0.0 - 0.2 K/uL   Comprehensive Metabolic Panel w/ Reflex to MG   Result Value Ref Range    Sodium 138 136 - 145 mmol/L    Potassium reflex Magnesium 4.1 3.5 - 5.1 mmol/L    Chloride 105 99 - 110 mmol/L    CO2 25 21 - 32 mmol/L    Anion Gap 8 3 - 16    Glucose 101 (H) 70 - 99 mg/dL    BUN 12 7 - 20 mg/dL    Creatinine 0.8 0.6 - 1.1 mg/dL    Est, Glom Filt Rate >90 >60    Calcium 9.2 8.3 - 10.6 mg/dL    Total Protein 7.2 6.4 - 8.2 g/dL    Albumin 4.4 3.4 - 5.0 g/dL    Albumin/Globulin Ratio 1.6 1.1 - 2.2    Total Bilirubin 0.3 0.0 - 1.0 mg/dL    Alkaline Phosphatase 45 40 - 129 U/L    ALT 11 10 - 40 U/L    AST 22 15 - 37 U/L   Lipase   Result Value Ref Range    Lipase 39.0 13.0 - 60.0 U/L   Urinalysis with Reflex to Culture    Specimen: Urine   Result Value Ref Range    Color, UA Yellow Straw/Yellow    Clarity, UA Clear Clear    Glucose, Ur Negative Negative mg/dL    Bilirubin, Urine Negative Negative    Ketones, Urine TRACE (A) Negative mg/dL    Specific Gravity, UA 1.025 1.005 - 1.030    Blood, Urine Negative Negative    pH, Urine 5.5 5.0 - 8.0    Protein, UA Negative Negative mg/dL    Urobilinogen, Urine 0.2 <2.0 E.U./dL    Nitrite, Urine Negative Negative    Leukocyte Esterase, Urine Negative Negative    Microscopic Examination Not Indicated     Urine Type NotGiven     Urine Reflex to Culture Not Indicated    Urine Preg (Lab)   Result Value Ref Range    Pregnancy, Urine Negative Detects HCG level >20 MIU/mL

## 2025-05-29 LAB
ANION GAP SERPL CALCULATED.3IONS-SCNC: 8 MMOL/L (ref 3–16)
BASOPHILS # BLD: 0 K/UL (ref 0–0.2)
BASOPHILS NFR BLD: 1 %
BUN SERPL-MCNC: 8 MG/DL (ref 7–20)
CALCIUM SERPL-MCNC: 8.3 MG/DL (ref 8.3–10.6)
CHLORIDE SERPL-SCNC: 107 MMOL/L (ref 99–110)
CO2 SERPL-SCNC: 23 MMOL/L (ref 21–32)
CREAT SERPL-MCNC: 0.8 MG/DL (ref 0.6–1.1)
CRP SERPL-MCNC: <3 MG/L (ref 0–5.1)
DEPRECATED RDW RBC AUTO: 13 % (ref 12.4–15.4)
EOSINOPHIL # BLD: 0.2 K/UL (ref 0–0.6)
EOSINOPHIL NFR BLD: 3.8 %
ERYTHROCYTE [SEDIMENTATION RATE] IN BLOOD BY WESTERGREN METHOD: <1 MM/HR (ref 0–20)
GFR SERPLBLD CREATININE-BSD FMLA CKD-EPI: >90 ML/MIN/{1.73_M2}
GLUCOSE SERPL-MCNC: 82 MG/DL (ref 70–99)
HCT VFR BLD AUTO: 32.8 % (ref 36–48)
HGB BLD-MCNC: 11.5 G/DL (ref 12–16)
LYMPHOCYTES # BLD: 1.3 K/UL (ref 1–5.1)
LYMPHOCYTES NFR BLD: 29.9 %
MCH RBC QN AUTO: 32.3 PG (ref 26–34)
MCHC RBC AUTO-ENTMCNC: 35.1 G/DL (ref 31–36)
MCV RBC AUTO: 92.2 FL (ref 80–100)
MONOCYTES # BLD: 0.5 K/UL (ref 0–1.3)
MONOCYTES NFR BLD: 12.1 %
NEUTROPHILS # BLD: 2.2 K/UL (ref 1.7–7.7)
NEUTROPHILS NFR BLD: 53.2 %
PLATELET # BLD AUTO: 185 K/UL (ref 135–450)
PMV BLD AUTO: 7.5 FL (ref 5–10.5)
POTASSIUM SERPL-SCNC: 3.8 MMOL/L (ref 3.5–5.1)
RBC # BLD AUTO: 3.56 M/UL (ref 4–5.2)
SODIUM SERPL-SCNC: 138 MMOL/L (ref 136–145)
WBC # BLD AUTO: 4.2 K/UL (ref 4–11)

## 2025-05-29 PROCEDURE — 85025 COMPLETE CBC W/AUTO DIFF WBC: CPT

## 2025-05-29 PROCEDURE — 6370000000 HC RX 637 (ALT 250 FOR IP)

## 2025-05-29 PROCEDURE — 36415 COLL VENOUS BLD VENIPUNCTURE: CPT

## 2025-05-29 PROCEDURE — 1200000000 HC SEMI PRIVATE

## 2025-05-29 PROCEDURE — 2500000003 HC RX 250 WO HCPCS

## 2025-05-29 PROCEDURE — 6370000000 HC RX 637 (ALT 250 FOR IP): Performed by: STUDENT IN AN ORGANIZED HEALTH CARE EDUCATION/TRAINING PROGRAM

## 2025-05-29 PROCEDURE — 99223 1ST HOSP IP/OBS HIGH 75: CPT | Performed by: SURGERY

## 2025-05-29 PROCEDURE — 86140 C-REACTIVE PROTEIN: CPT

## 2025-05-29 PROCEDURE — 2580000003 HC RX 258

## 2025-05-29 PROCEDURE — 85652 RBC SED RATE AUTOMATED: CPT

## 2025-05-29 PROCEDURE — 80048 BASIC METABOLIC PNL TOTAL CA: CPT

## 2025-05-29 RX ORDER — FEXOFENADINE HCL 180 MG/1
180 TABLET ORAL DAILY
Status: DISCONTINUED | OUTPATIENT
Start: 2025-05-29 | End: 2025-05-31 | Stop reason: HOSPADM

## 2025-05-29 RX ORDER — HYDROXYZINE HYDROCHLORIDE 25 MG/1
50 TABLET, FILM COATED ORAL 3 TIMES DAILY PRN
Status: DISCONTINUED | OUTPATIENT
Start: 2025-05-29 | End: 2025-05-31 | Stop reason: HOSPADM

## 2025-05-29 RX ORDER — ONDANSETRON 2 MG/ML
4 INJECTION INTRAMUSCULAR; INTRAVENOUS EVERY 6 HOURS PRN
Status: DISCONTINUED | OUTPATIENT
Start: 2025-05-29 | End: 2025-05-31 | Stop reason: HOSPADM

## 2025-05-29 RX ORDER — MONTELUKAST SODIUM 10 MG/1
10 TABLET ORAL NIGHTLY
Status: DISCONTINUED | OUTPATIENT
Start: 2025-05-29 | End: 2025-05-29

## 2025-05-29 RX ORDER — SODIUM CHLORIDE 0.9 % (FLUSH) 0.9 %
5-40 SYRINGE (ML) INJECTION PRN
Status: DISCONTINUED | OUTPATIENT
Start: 2025-05-29 | End: 2025-05-31 | Stop reason: HOSPADM

## 2025-05-29 RX ORDER — HYDROXYZINE HYDROCHLORIDE 10 MG/1
10 TABLET, FILM COATED ORAL 3 TIMES DAILY PRN
Status: DISCONTINUED | OUTPATIENT
Start: 2025-05-29 | End: 2025-05-29

## 2025-05-29 RX ORDER — DESVENLAFAXINE 50 MG/1
150 TABLET, EXTENDED RELEASE ORAL DAILY
Status: DISCONTINUED | OUTPATIENT
Start: 2025-05-29 | End: 2025-05-31 | Stop reason: HOSPADM

## 2025-05-29 RX ORDER — SODIUM CHLORIDE 0.9 % (FLUSH) 0.9 %
5-40 SYRINGE (ML) INJECTION EVERY 12 HOURS SCHEDULED
Status: DISCONTINUED | OUTPATIENT
Start: 2025-05-29 | End: 2025-05-31 | Stop reason: HOSPADM

## 2025-05-29 RX ORDER — MONTELUKAST SODIUM 10 MG/1
10 TABLET ORAL EVERY MORNING
Status: DISCONTINUED | OUTPATIENT
Start: 2025-05-29 | End: 2025-05-31 | Stop reason: HOSPADM

## 2025-05-29 RX ORDER — ONDANSETRON 4 MG/1
4 TABLET, ORALLY DISINTEGRATING ORAL EVERY 8 HOURS PRN
Status: DISCONTINUED | OUTPATIENT
Start: 2025-05-29 | End: 2025-05-31 | Stop reason: HOSPADM

## 2025-05-29 RX ORDER — BUPROPION HYDROCHLORIDE 150 MG/1
150 TABLET ORAL EVERY MORNING
COMMUNITY

## 2025-05-29 RX ORDER — POLYETHYLENE GLYCOL 3350 17 G/17G
17 POWDER, FOR SOLUTION ORAL DAILY PRN
Status: DISCONTINUED | OUTPATIENT
Start: 2025-05-29 | End: 2025-05-31

## 2025-05-29 RX ORDER — BUPROPION HYDROCHLORIDE 150 MG/1
150 TABLET ORAL EVERY MORNING
Status: DISCONTINUED | OUTPATIENT
Start: 2025-05-29 | End: 2025-05-31 | Stop reason: HOSPADM

## 2025-05-29 RX ORDER — QUETIAPINE 300 MG/1
600 TABLET, FILM COATED, EXTENDED RELEASE ORAL NIGHTLY
COMMUNITY

## 2025-05-29 RX ORDER — ENOXAPARIN SODIUM 100 MG/ML
40 INJECTION SUBCUTANEOUS DAILY
Status: DISCONTINUED | OUTPATIENT
Start: 2025-05-29 | End: 2025-05-31 | Stop reason: HOSPADM

## 2025-05-29 RX ORDER — ACETAMINOPHEN 650 MG/1
650 SUPPOSITORY RECTAL EVERY 6 HOURS PRN
Status: DISCONTINUED | OUTPATIENT
Start: 2025-05-29 | End: 2025-05-31 | Stop reason: HOSPADM

## 2025-05-29 RX ORDER — QUETIAPINE 200 MG/1
600 TABLET, FILM COATED, EXTENDED RELEASE ORAL NIGHTLY
Status: DISCONTINUED | OUTPATIENT
Start: 2025-05-29 | End: 2025-05-31 | Stop reason: HOSPADM

## 2025-05-29 RX ORDER — ACETAMINOPHEN 325 MG/1
650 TABLET ORAL EVERY 6 HOURS PRN
Status: DISCONTINUED | OUTPATIENT
Start: 2025-05-29 | End: 2025-05-31 | Stop reason: HOSPADM

## 2025-05-29 RX ORDER — ACETAMINOPHEN AND CODEINE PHOSPHATE 120; 12 MG/5ML; MG/5ML
1 SOLUTION ORAL DAILY
Status: DISCONTINUED | OUTPATIENT
Start: 2025-05-29 | End: 2025-05-31 | Stop reason: HOSPADM

## 2025-05-29 RX ORDER — ARMODAFINIL 250 MG/1
250 TABLET ORAL DAILY
Status: DISCONTINUED | OUTPATIENT
Start: 2025-05-29 | End: 2025-05-31 | Stop reason: HOSPADM

## 2025-05-29 RX ORDER — SODIUM CHLORIDE 9 MG/ML
INJECTION, SOLUTION INTRAVENOUS PRN
Status: DISCONTINUED | OUTPATIENT
Start: 2025-05-29 | End: 2025-05-31 | Stop reason: HOSPADM

## 2025-05-29 RX ORDER — TRAZODONE HYDROCHLORIDE 100 MG/1
300 TABLET ORAL NIGHTLY
Status: DISCONTINUED | OUTPATIENT
Start: 2025-05-29 | End: 2025-05-31 | Stop reason: HOSPADM

## 2025-05-29 RX ORDER — ARIPIPRAZOLE 5 MG/1
10 TABLET ORAL DAILY
Status: DISCONTINUED | OUTPATIENT
Start: 2025-05-29 | End: 2025-05-31 | Stop reason: HOSPADM

## 2025-05-29 RX ORDER — PROPRANOLOL HCL 20 MG
40 TABLET ORAL DAILY
Status: DISCONTINUED | OUTPATIENT
Start: 2025-05-29 | End: 2025-05-31 | Stop reason: HOSPADM

## 2025-05-29 RX ORDER — ACETAMINOPHEN AND CODEINE PHOSPHATE 120; 12 MG/5ML; MG/5ML
1 SOLUTION ORAL DAILY
COMMUNITY

## 2025-05-29 RX ORDER — FLUTICASONE PROPIONATE 50 MCG
1 SPRAY, SUSPENSION (ML) NASAL DAILY
Status: DISCONTINUED | OUTPATIENT
Start: 2025-05-29 | End: 2025-05-31 | Stop reason: HOSPADM

## 2025-05-29 RX ADMIN — SODIUM CHLORIDE, SODIUM LACTATE, POTASSIUM CHLORIDE, AND CALCIUM CHLORIDE: .6; .31; .03; .02 INJECTION, SOLUTION INTRAVENOUS at 07:31

## 2025-05-29 RX ADMIN — TRAZODONE HYDROCHLORIDE 300 MG: 100 TABLET ORAL at 23:09

## 2025-05-29 RX ADMIN — ARIPIPRAZOLE 10 MG: 5 TABLET ORAL at 08:16

## 2025-05-29 RX ADMIN — MONTELUKAST SODIUM 10 MG: 10 TABLET, FILM COATED ORAL at 12:19

## 2025-05-29 RX ADMIN — HYDROXYZINE HYDROCHLORIDE 50 MG: 25 TABLET ORAL at 18:20

## 2025-05-29 RX ADMIN — FLUTICASONE PROPIONATE 1 SPRAY: 50 SPRAY, METERED NASAL at 12:19

## 2025-05-29 RX ADMIN — LINACLOTIDE 145 MCG: 145 CAPSULE, GELATIN COATED ORAL at 12:30

## 2025-05-29 RX ADMIN — ACETAMINOPHEN 650 MG: 325 TABLET ORAL at 21:09

## 2025-05-29 RX ADMIN — QUETIAPINE FUMARATE 600 MG: 200 TABLET, EXTENDED RELEASE ORAL at 00:57

## 2025-05-29 RX ADMIN — QUETIAPINE FUMARATE 600 MG: 200 TABLET, EXTENDED RELEASE ORAL at 23:09

## 2025-05-29 RX ADMIN — PROPRANOLOL HYDROCHLORIDE 40 MG: 20 TABLET ORAL at 10:41

## 2025-05-29 RX ADMIN — HYDROXYZINE HYDROCHLORIDE 50 MG: 25 TABLET ORAL at 12:01

## 2025-05-29 RX ADMIN — BUPROPION HYDROCHLORIDE 150 MG: 150 TABLET, EXTENDED RELEASE ORAL at 10:41

## 2025-05-29 RX ADMIN — ACETAMINOPHEN 650 MG: 325 TABLET ORAL at 12:39

## 2025-05-29 RX ADMIN — TRAZODONE HYDROCHLORIDE 300 MG: 100 TABLET ORAL at 00:57

## 2025-05-29 RX ADMIN — HYDROXYZINE HYDROCHLORIDE 50 MG: 25 TABLET ORAL at 21:09

## 2025-05-29 RX ADMIN — SODIUM CHLORIDE, PRESERVATIVE FREE 10 ML: 5 INJECTION INTRAVENOUS at 10:44

## 2025-05-29 ASSESSMENT — ENCOUNTER SYMPTOMS
BLOOD IN STOOL: 0
ABDOMINAL PAIN: 1
DIARRHEA: 1
NAUSEA: 1
SHORTNESS OF BREATH: 0
VOMITING: 0

## 2025-05-29 ASSESSMENT — PAIN DESCRIPTION - LOCATION
LOCATION: HEAD
LOCATION: ABDOMEN

## 2025-05-29 ASSESSMENT — PAIN DESCRIPTION - FREQUENCY: FREQUENCY: CONTINUOUS

## 2025-05-29 ASSESSMENT — PAIN DESCRIPTION - ORIENTATION
ORIENTATION: RIGHT;LEFT;ANTERIOR;POSTERIOR;UPPER
ORIENTATION: RIGHT;LEFT;MID

## 2025-05-29 ASSESSMENT — PAIN SCALES - GENERAL
PAINLEVEL_OUTOF10: 0
PAINLEVEL_OUTOF10: 8
PAINLEVEL_OUTOF10: 6

## 2025-05-29 ASSESSMENT — PAIN DESCRIPTION - DESCRIPTORS
DESCRIPTORS: ACHING;DULL;SHARP
DESCRIPTORS: ACHING

## 2025-05-29 ASSESSMENT — PAIN DESCRIPTION - ONSET: ONSET: ON-GOING

## 2025-05-29 ASSESSMENT — PAIN - FUNCTIONAL ASSESSMENT: PAIN_FUNCTIONAL_ASSESSMENT: ACTIVITIES ARE NOT PREVENTED

## 2025-05-29 ASSESSMENT — PAIN DESCRIPTION - PAIN TYPE: TYPE: ACUTE PAIN

## 2025-05-29 NOTE — CARE COORDINATION
Case Management           Date/ Time of Note: 5/29/2025 3:24 PM  Note completed by: KHALIF Duffy, PAYTON    If patient is discharged prior to next notation, then this note serves as note for discharge by case management.    Patient Name: Priti Louise  YOB: 1985    Diagnosis:Ileus (HCC) [K56.7]  Patient Admission Status: Inpatient  Date of Admission:5/28/2025  6:48 PM    Length of Stay: 1 GLOS: GMLOS: 3 Readmission Risk Score: Readmission Risk Score: 5.7    Current Plan of Care:     Pt is from home and indp at baseline. Pt hs no current services at home and plans to dc home.   Npo, gi cons, surg cons, plan for MRE rule out bowel wall abnormalities,     Referrals completed: Not Applicable    Resources/ information provided: Not indicated at this time    IMM Status:        Priti and her family were provided with choice of provider; she and her family are in agreement with the discharge plan.    Electronically signed by KHALIF Duffy, PAYTON, DAVID on 5/29/2025 at 3:24 PM  [facility]  Case Management Department  Ph: [phone]

## 2025-05-29 NOTE — PLAN OF CARE
Problem: Discharge Planning  Goal: Discharge to home or other facility with appropriate resources  5/29/2025 1839 by Sharon Lima RN  Flowsheets (Taken 5/29/2025 1839)  Discharge to home or other facility with appropriate resources:   Identify barriers to discharge with patient and caregiver   Identify discharge learning needs (meds, wound care, etc)   Refer to discharge planning if patient needs post-hospital services based on physician order or complex needs related to functional status, cognitive ability or social support system   Arrange for needed discharge resources and transportation as appropriate   Arrange for interpreters to assist at discharge as needed  5/29/2025 0552 by Vero Sinha RN  Outcome: Progressing     Problem: Pain  Goal: Verbalizes/displays adequate comfort level or baseline comfort level  5/29/2025 1839 by Sharon Lima RN  Flowsheets (Taken 5/29/2025 1839)  Verbalizes/displays adequate comfort level or baseline comfort level:   Encourage patient to monitor pain and request assistance   Administer analgesics based on type and severity of pain and evaluate response   Consider cultural and social influences on pain and pain management   Assess pain using appropriate pain scale   Implement non-pharmacological measures as appropriate and evaluate response   Notify Licensed Independent Practitioner if interventions unsuccessful or patient reports new pain  5/29/2025 0552 by Vero Sinha RN  Outcome: Progressing     Problem: Safety - Adult  Goal: Free from fall injury  Flowsheets (Taken 5/29/2025 1839)  Free From Fall Injury:   Instruct family/caregiver on patient safety   Based on caregiver fall risk screen, instruct family/caregiver to ask for assistance with transferring infant if caregiver noted to have fall risk factors     Problem: Chronic Conditions and Co-morbidities  Goal: Patient's chronic conditions and co-morbidity symptoms are monitored and maintained or

## 2025-05-29 NOTE — PROGRESS NOTES
Internal Medicine Progress Note    Patient: Priti Louise   : 1985   Admit Date: 2025     Date: 2025     Interval History   No acute events overnight.  Patient endorses continued nausea and abdominal pain.  She endorses feeling anxious.  Labs otherwise stable.  MRI enterography ordered.  GI for evaluation of small bowel.    Brief plan of the day:  - Hydroxyzine 50 mg 3 times daily as needed ordered for anxiety  - N.p.o.  - GI consulted, appreciate recs  - General Surgery following      ROS     Review of Systems   Gastrointestinal:  Positive for abdominal pain and nausea.   Psychiatric/Behavioral:  The patient is nervous/anxious.    All other systems reviewed and are negative.       Objective     I/Os:  No intake/output data recorded.    Vital Signs:  No data found.    Physical Exam:  Physical Exam  Eyes:      General: No scleral icterus.     Conjunctiva/sclera: Conjunctivae normal.      Pupils: Pupils are equal, round, and reactive to light.   Cardiovascular:      Rate and Rhythm: Normal rate and regular rhythm.   Pulmonary:      Effort: Pulmonary effort is normal. No respiratory distress.      Breath sounds: No wheezing.   Abdominal:      General: Abdomen is flat.      Palpations: There is no mass.      Tenderness: There is abdominal tenderness. There is no guarding or rebound.   Musculoskeletal:      Right lower leg: No edema.      Left lower leg: No edema.   Skin:     General: Skin is warm.      Coloration: Skin is not jaundiced.      Findings: No bruising.   Neurological:      General: No focal deficit present.      Mental Status: She is alert and oriented to person, place, and time. Mental status is at baseline.         Medication:  Scheduled:     ARIPiprazole  10 mg Oral Daily    Armodafinil  250 mg Oral Daily    buPROPion  150 mg Oral QAM    QUEtiapine  600 mg Oral Nightly    traZODone  300 mg Oral Nightly    fluticasone  1 spray Each Nostril Daily    linaclotide  145 mcg Oral QAM AC

## 2025-05-29 NOTE — H&P
Internal Medicine  PGY 2  History & Physical      CC: abdominal pain and distention    History Obtained From:  patient    HISTORY OF PRESENT ILLNESS:   Priti Louise is an 40 y.o. female w/ hx of medullary sponge kidney, Depression, ADHD, PTSD, s/p  section 6 years ago who presents for evaluation of 3 weeks of progressive diffuse abdominal discomfort and distention which significantly worsened throughout the day on .  She has been eating and drinking normally.  She has noticed some nausea without emesis.  Priti also has chronic diarrhea (typically 2 bowel movements a day) which she states is secondary to taking Linzess which she takes for constipation.  The patient has had issues gaining some weight over the past 3 weeks.  No weight loss.  No night sweats.  No hematochezia.  She does note she has chronic flank pain bilaterally, left more than right.  She also has associated intermittent subjective fevers which have been ongoing for years which she states is related to her medullary sponge kidney.    Of note, she was seen in urgent care approximately 3 weeks ago for a suspected \"kidney infection\" for which she was prescribed an antibiotic whose name she does not recall.  She did finish his antibiotics and only noticed a mild improvement in her symptoms of chronic flank pain and dysuria before her symptoms recurred and continued.    No recent medication or dietary changes aside from the after mentioned antibiotic. No recent travel.  No recent cough, cold, sick contacts, flulike illness.    Patient indicates that she has had lifelong \"kidney pain\" related to medullary sponge kidney for which she previously saw nephrologist.  The last time she saw a nephrologist was approximately 1 year ago during a 6-day admission for flank pain.     The patient drinks alcohol approximately once a month, typically 1 or 2 beers or glasses of wine.  No current or prior tobacco use.  Denies illicit substances such as

## 2025-05-29 NOTE — CONSULTS
Clinical Pharmacy Progress Note  Medication History     Pharmacy consulted to verify home medication list by Dr. Soto.    List of of current medications patient is taking is complete. Home Medication list in EPIC updated to reflect changes noted below.    Source of information: RN/resident assessment on admission, pharmacy fills via Dispense Report, PCP office notes    Patient's home pharmacy: [facility] [address] ([phone])     Changes made to medication list:   Medications removed:   Ketorolac  Metoclopramide    Medications added:   None    Medication doses adjusted / updated:   Propranolol - currently takes 40mg po BID    Please call with questions--  Thanks--  Tatiana Gill, PharmD, BCPS, BCGP  [id number] ([facility])   5/29/2025 9:17 AM      Current Outpatient Medications   Medication Instructions    albuterol sulfate  (90 Base) MCG/ACT inhaler 2 puffs, Inhalation, EVERY 6 HOURS PRN    ARIPiprazole (ABILIFY) 10 mg, DAILY    Armodafinil 250 mg, Oral, DAILY    buPROPion (WELLBUTRIN XL) 150 mg, EVERY MORNING    desvenlafaxine succinate (PRISTIQ) 150 mg, DAILY    fexofenadine (ALLEGRA) 180 mg, DAILY    fluticasone (FLONASE) 50 MCG/ACT nasal spray 1 spray, DAILY    linaclotide (LINZESS) 145 mcg, DAILY BEFORE BREAKFAST    montelukast (SINGULAIR) 10 mg, NIGHTLY    norethindrone (INCASSIA) 0.35 MG tablet 1 tablet, DAILY    ondansetron (ZOFRAN) 8 mg, Oral, EVERY 8 HOURS PRN    propranolol (INDERAL) 40 mg, Oral, 2 TIMES DAILY    QUEtiapine (SEROQUEL XR) 600 mg, NIGHTLY    traZODone (DESYREL) 300 mg, Nightly

## 2025-05-29 NOTE — PROGRESS NOTES
4 Eyes Skin Assessment     NAME:  Priti Louise  YOB: 1985  MEDICAL RECORD NUMBER:  [id number]    The patient is being assessed for  Admission    I agree that at least one RN has performed a thorough Head to Toe Skin Assessment on the patient. ALL assessment sites listed below have been assessed.      Areas assessed by both nurses:    Head, Face, Ears, Shoulders, Back, Chest, Arms, Elbows, Hands, Sacrum. Buttock, Coccyx, Ischium, and Legs. Feet and Heels        Does the Patient have a Wound? No noted wound(s)       Alan Prevention initiated by RN: Yes  Wound Care Orders initiated by RN: No    Pressure Injury (Stage 3,4, Unstageable, DTI, NWPT, and Complex wounds) if present, place Wound referral order by RN under : No    New Ostomies, if present place, Ostomy referral order under : No     Nurse 1 eSignature: Electronically signed by Vero Sinha RN on 5/29/25 at 2:12 AM EDT    **SHARE this note so that the co-signing nurse can place an eSignature**    Nurse 2 eSignature: Electronically signed by Deborah Ramos RN on 5/29/25 at 2:17 AM EDT

## 2025-05-29 NOTE — PROGRESS NOTES
Patient asked not get disturbed to be able to sleep. Internal medicine resident made aware and agreed to not to take vitals at this time.   Pt's HR 79 sinus Rhythm on Tele.

## 2025-05-29 NOTE — CONSULTS
General Surgery   Resident Consult Note    Reason for Consult: SBO vs enteritis    History of Present Illness:   Priti Louise is a 40 y.o. female with Hx of seizures, PTSD, fibromylagia, depression, anxiety, asthma, with previous , history of slow transit constipation and IBS, who presents for distended abdomen and abdominal pain. Patient has nausea no vomiting. Abdominal pain present for three weeks. She is passing flatus and having bowel movements.     In the ED, patient is hemodynamically stable. Labs were mainly within normal limits. CT abdomen and pelvis showed Fluid-filled loops of small bowel colon with no obstructive transition zone. Findings may represent enteritis. Patient was transferred to [facility] with general surgery consulted.     Past Medical History:        Diagnosis Date    Allergic rhinitis     Anxiety     Asthma     Cervical pain (neck)     chronic    Depression     Dizziness     Fibromyalgia     Hearing loss     Kidney stone     Migraine     PTSD (post-traumatic stress disorder)     Seizures (HCC)     Sinusitis        Past Surgical History:           Procedure Laterality Date    SINUS SURGERY      TONSILLECTOMY         Allergies:  Flomax [tamsulosin], Haloperidol, and Sulfa antibiotics    Medications:   Home Meds  No current facility-administered medications on file prior to encounter.     Current Outpatient Medications on File Prior to Encounter   Medication Sig Dispense Refill    ARIPiprazole (ABILIFY) 10 MG tablet Take 1 tablet by mouth daily      Armodafinil (NUVIGIL) 150 MG TABS tablet Take 1 tablet by mouth daily. Max Daily Amount: 150 mg      ketorolac (TORADOL) 10 MG tablet Take 1 tablet by mouth every 6 hours as needed for Pain TAKE WITH FOOD 12 tablet 0    metoclopramide (REGLAN) 10 MG tablet Take 1 tablet by mouth 3 times daily as needed (vomiting/nausea) 15 tablet 0    ondansetron (ZOFRAN) 8 MG tablet Take 1 tablet by mouth every 8 hours as needed for Nausea 20 tablet 0

## 2025-05-29 NOTE — PROGRESS NOTES
Surgery update     Patient seen for morning rounds. Surgery tram introduced themselves but patient did not want to be awakened to participate in history or exam.     Sandy Bautista   General Surgery PGY 4

## 2025-05-29 NOTE — CONSULTS
Consultation Note    Patient Name: Priti Louise  : 1985  Age: 40 y.o.     Admitting Physician: Deonna Noonan MD   Date of Admission: 2025  6:48 PM   Primary Care Physician: Allison Cuellar MD        Priti Louise is being seen at the request of Deonna Noonan MD for abdominal pain and constipation.    History of Present Illness:  40-year-old female with a longstanding history of chronic constipation who has been on Linzess 145 mcg once daily for several years.  She reports that she still has trouble passing stools and therefore uses enemas several times a week.  She had previously tried the higher dose of Linzess, but this gave her significant diarrhea.  She did just take 2 of the Linzess pills several days ago, and subsequently got diarrhea.  She had more abdominal distention and was therefore evaluated in emergency room, which showed an ileus.  Patient denies any rectal bleeding, nausea, vomiting.  Her abdominal distention feels improved now.    GI History:  -endoscopy 2022 for evaluation of epigastric pain with mild gastritis, H. pylori negative.      Past Medical History:  Past Medical History:   Diagnosis Date    Allergic rhinitis     Anxiety     Asthma     Cervical pain (neck)     chronic    Depression     Dizziness     Fibromyalgia     Hearing loss     Kidney stone     Migraine     PTSD (post-traumatic stress disorder)     Seizures (HCC)     Sinusitis         Past Surgical History:  Past Surgical History:   Procedure Laterality Date    SINUS SURGERY      TONSILLECTOMY          Historical Medications:  Prior to Visit Medications    Medication Sig Taking? Authorizing Provider   linaclotide (LINZESS) 145 MCG capsule Take 1 capsule by mouth every morning (before breakfast) Yes ProviderDominique MD   QUEtiapine (SEROQUEL XR) 300 MG extended release tablet Take 2 tablets by mouth nightly Yes ProviderDominique MD   buPROPion (WELLBUTRIN XL) 150 MG extended release tablet Take 1  or dependent edema  Gastrointestinal: See HPI  Musculoskeletal: No usual joint pain or stiffness  Skin: No skin eruptions or changing lesions  Neurologic: No focal weakness or numbness  Psychiatric: No anxiety or sleep disturbance    Physical Exam:  Vital Signs:   Vitals:    05/29/25 1057   BP: 104/69   Pulse: 78   Resp: 18   Temp: 98.1 °F (36.7 °C)   SpO2: 97%       General: Well-nourished, well-developed  HEENT: Sclera anicteric, mucosal membranes moist  Cardiovascular: Regular rate and rhythm.  No murmurs.  Respiratory: Respirations nonlabored, no crepitus  GI: Abdomen minimally distended, soft, and nontender.  Normal active bowel sounds.  No masses palpable.   Rectal: Deferred  Musculoskeletal: No pitting edema of the lower legs.  Neurological: Gross memory appears intact.  Patient is alert and oriented      Recent Imaging:   CT ABDOMEN PELVIS W IV CONTRAST Additional Contrast? None  Narrative: EXAM: CT ABDOMEN AND PELVIS with contrast    INDICATION: abd pain ro sbo, volvulus, hx medullary sponge kidney    COMPARISON: 3/1/2024    TECHNIQUE: Multiplanar reformatted images of the abdomen and pelvis are provided for review.  IV Contrast: Isovue-370, 80 cc  Oral Contrast: None    CT radiation dose optimization techniques (automated exposure control, use of a iterative reconstruction techniques, or adjustment of the mA or KV according to the patients size) were used to limit patient radiation dose.    FINDINGS:  Lung bases: Clear.    Liver: Normal.  Gallbladder and Biliary Tree: No calcified gallstones. Normal wall thickness.  No intra- or extrahepatic biliary dilatation.    Pancreas: Normal.  Spleen: Normal.  Adrenal Glands: Normal.    Kidneys and Ureters: Symmetric postcontrast enhancement. No hydronephrosis or mass. Normal renal size..  Urinary Bladder: Bladder is empty, decompressed..  Reproductive Organs: No associated masses. Right ovarian cyst measuring 3.1 x 2.4 cm in the anterior right lower quadrant with

## 2025-05-30 ENCOUNTER — APPOINTMENT (OUTPATIENT)
Dept: MRI IMAGING | Age: 40
End: 2025-05-30
Payer: COMMERCIAL

## 2025-05-30 LAB
ANION GAP SERPL CALCULATED.3IONS-SCNC: 8 MMOL/L (ref 3–16)
BASOPHILS # BLD: 0 K/UL (ref 0–0.2)
BASOPHILS NFR BLD: 0.8 %
BUN SERPL-MCNC: 6 MG/DL (ref 7–20)
CALCIUM SERPL-MCNC: 8.7 MG/DL (ref 8.3–10.6)
CHLORIDE SERPL-SCNC: 106 MMOL/L (ref 99–110)
CO2 SERPL-SCNC: 22 MMOL/L (ref 21–32)
CREAT SERPL-MCNC: 0.8 MG/DL (ref 0.6–1.1)
DEPRECATED RDW RBC AUTO: 12.7 % (ref 12.4–15.4)
EOSINOPHIL # BLD: 0.1 K/UL (ref 0–0.6)
EOSINOPHIL NFR BLD: 2.9 %
GFR SERPLBLD CREATININE-BSD FMLA CKD-EPI: >90 ML/MIN/{1.73_M2}
GLUCOSE SERPL-MCNC: 117 MG/DL (ref 70–99)
HCT VFR BLD AUTO: 35.3 % (ref 36–48)
HGB BLD-MCNC: 12.1 G/DL (ref 12–16)
LYMPHOCYTES # BLD: 0.9 K/UL (ref 1–5.1)
LYMPHOCYTES NFR BLD: 22.4 %
MCH RBC QN AUTO: 31.8 PG (ref 26–34)
MCHC RBC AUTO-ENTMCNC: 34.3 G/DL (ref 31–36)
MCV RBC AUTO: 92.6 FL (ref 80–100)
MONOCYTES # BLD: 0.4 K/UL (ref 0–1.3)
MONOCYTES NFR BLD: 10.6 %
NEUTROPHILS # BLD: 2.4 K/UL (ref 1.7–7.7)
NEUTROPHILS NFR BLD: 63.3 %
PLATELET # BLD AUTO: ABNORMAL K/UL (ref 135–450)
PLATELET BLD QL SMEAR: ABNORMAL
PMV BLD AUTO: ABNORMAL FL (ref 5–10.5)
POTASSIUM SERPL-SCNC: 4.1 MMOL/L (ref 3.5–5.1)
RBC # BLD AUTO: 3.81 M/UL (ref 4–5.2)
SODIUM SERPL-SCNC: 136 MMOL/L (ref 136–145)
TSH SERPL DL<=0.005 MIU/L-ACNC: 0.9 UIU/ML (ref 0.27–4.2)
WBC # BLD AUTO: 3.8 K/UL (ref 4–11)

## 2025-05-30 PROCEDURE — 99232 SBSQ HOSP IP/OBS MODERATE 35: CPT | Performed by: SURGERY

## 2025-05-30 PROCEDURE — 74182 MRI ABDOMEN W/CONTRAST: CPT

## 2025-05-30 PROCEDURE — 6370000000 HC RX 637 (ALT 250 FOR IP)

## 2025-05-30 PROCEDURE — 6360000002 HC RX W HCPCS

## 2025-05-30 PROCEDURE — 6360000004 HC RX CONTRAST MEDICATION: Performed by: INTERNAL MEDICINE

## 2025-05-30 PROCEDURE — A9579 GAD-BASE MR CONTRAST NOS,1ML: HCPCS | Performed by: INTERNAL MEDICINE

## 2025-05-30 PROCEDURE — 85025 COMPLETE CBC W/AUTO DIFF WBC: CPT

## 2025-05-30 PROCEDURE — 2580000003 HC RX 258

## 2025-05-30 PROCEDURE — 2500000003 HC RX 250 WO HCPCS

## 2025-05-30 PROCEDURE — 1200000000 HC SEMI PRIVATE

## 2025-05-30 PROCEDURE — 2709999900 MRI ABDOMEN W WO CONTRAST

## 2025-05-30 PROCEDURE — 6360000002 HC RX W HCPCS: Performed by: STUDENT IN AN ORGANIZED HEALTH CARE EDUCATION/TRAINING PROGRAM

## 2025-05-30 PROCEDURE — 36415 COLL VENOUS BLD VENIPUNCTURE: CPT

## 2025-05-30 PROCEDURE — 2709999900 MRI ABDOMEN W CONTRAST

## 2025-05-30 PROCEDURE — 84443 ASSAY THYROID STIM HORMONE: CPT

## 2025-05-30 PROCEDURE — 80048 BASIC METABOLIC PNL TOTAL CA: CPT

## 2025-05-30 RX ORDER — LORAZEPAM 0.5 MG/1
0.5 TABLET ORAL
Status: COMPLETED | OUTPATIENT
Start: 2025-05-30 | End: 2025-05-30

## 2025-05-30 RX ORDER — GLUCAGON 1 MG/ML
1 KIT INJECTION ONCE
Status: COMPLETED | OUTPATIENT
Start: 2025-05-30 | End: 2025-05-30

## 2025-05-30 RX ORDER — LORAZEPAM 2 MG/ML
0.5 INJECTION INTRAMUSCULAR ONCE
Status: DISCONTINUED | OUTPATIENT
Start: 2025-05-30 | End: 2025-05-30

## 2025-05-30 RX ADMIN — TRAZODONE HYDROCHLORIDE 300 MG: 100 TABLET ORAL at 23:30

## 2025-05-30 RX ADMIN — LORAZEPAM 0.5 MG: 0.5 TABLET ORAL at 22:19

## 2025-05-30 RX ADMIN — BUPROPION HYDROCHLORIDE 150 MG: 150 TABLET, EXTENDED RELEASE ORAL at 10:26

## 2025-05-30 RX ADMIN — SODIUM CHLORIDE, PRESERVATIVE FREE 10 ML: 5 INJECTION INTRAVENOUS at 10:31

## 2025-05-30 RX ADMIN — QUETIAPINE FUMARATE 600 MG: 200 TABLET, EXTENDED RELEASE ORAL at 23:31

## 2025-05-30 RX ADMIN — ARMODAFINIL 250 MG: 250 TABLET ORAL at 10:25

## 2025-05-30 RX ADMIN — HYDROXYZINE HYDROCHLORIDE 50 MG: 25 TABLET ORAL at 21:14

## 2025-05-30 RX ADMIN — FLUTICASONE PROPIONATE 1 SPRAY: 50 SPRAY, METERED NASAL at 10:31

## 2025-05-30 RX ADMIN — SODIUM CHLORIDE, SODIUM LACTATE, POTASSIUM CHLORIDE, AND CALCIUM CHLORIDE: .6; .31; .03; .02 INJECTION, SOLUTION INTRAVENOUS at 08:44

## 2025-05-30 RX ADMIN — SODIUM CHLORIDE, PRESERVATIVE FREE 10 ML: 5 INJECTION INTRAVENOUS at 23:32

## 2025-05-30 RX ADMIN — LINACLOTIDE 145 MCG: 145 CAPSULE, GELATIN COATED ORAL at 10:26

## 2025-05-30 RX ADMIN — GLUCAGON 1 MG: 1 INJECTION, POWDER, LYOPHILIZED, FOR SOLUTION INTRAMUSCULAR; INTRAVENOUS at 15:13

## 2025-05-30 RX ADMIN — MONTELUKAST SODIUM 10 MG: 10 TABLET, FILM COATED ORAL at 10:26

## 2025-05-30 RX ADMIN — PROPRANOLOL HYDROCHLORIDE 40 MG: 20 TABLET ORAL at 10:26

## 2025-05-30 RX ADMIN — ARIPIPRAZOLE 10 MG: 5 TABLET ORAL at 10:24

## 2025-05-30 RX ADMIN — HYDROXYZINE HYDROCHLORIDE 50 MG: 25 TABLET ORAL at 11:55

## 2025-05-30 RX ADMIN — GADOTERIDOL 14 ML: 279.3 INJECTION, SOLUTION INTRAVENOUS at 15:28

## 2025-05-30 ASSESSMENT — PAIN DESCRIPTION - LOCATION: LOCATION: ABDOMEN

## 2025-05-30 ASSESSMENT — PAIN SCALES - GENERAL
PAINLEVEL_OUTOF10: 0
PAINLEVEL_OUTOF10: 0
PAINLEVEL_OUTOF10: 4

## 2025-05-30 ASSESSMENT — ENCOUNTER SYMPTOMS
NAUSEA: 1
ABDOMINAL PAIN: 1

## 2025-05-30 ASSESSMENT — PAIN DESCRIPTION - DESCRIPTORS: DESCRIPTORS: DISCOMFORT

## 2025-05-30 NOTE — PROGRESS NOTES
Pt refusing telemetry and IV fluids overnight  Educated on importance of fluids and monitoring  Pt stated that she needs to sleep and cannot sleep with those items, continued to refuse.

## 2025-05-30 NOTE — CARE COORDINATION
Case Management           Date/ Time of Note: 5/30/2025 3:44 PM  Note completed by: KHALIF Duffy, PAYTON    If patient is discharged prior to next notation, then this note serves as note for discharge by case management.    Patient Name: Priti Louise  YOB: 1985    Diagnosis:Ileus (HCC) [K56.7]  Patient Admission Status: Inpatient  Date of Admission:5/28/2025  6:48 PM    Length of Stay: 2 GLOS: GMLOS: 3 Readmission Risk Score: Readmission Risk Score: 6.4    Current Plan of Care:     Pt is from home and indp at baseline. Pt hs no current services at home and plans to dc home.   Npo, gi following, surg following, MRI today     Referrals completed: Not Applicable    Resources/ information provided: Not indicated at this time    IMM Status:        Priti and her family were provided with choice of provider; she and her family are in agreement with the discharge plan.    Electronically signed by KHALIF Duffy, PAYTON, DAVID on 5/30/2025 at 3:44 PM  [facility]  Case Management Department  Ph: [phone]

## 2025-05-30 NOTE — PROGRESS NOTES
Progress Note    Patient Priti Louise  MRN: [id number]  YOB: 1985 Age: 40 y.o. Sex: female  Room: [address] [place] [address]       Admitting Physician: Erin Negron MD   Date of Admission: 5/28/2025  6:48 PM   Primary Care Physician: Allison Cuellar MD     Subjective:  Priit Louise was seen and examined. We are following for ileus/constipation.  -- no BM today. Had many loose stools on Wednesday.    ROS:  Constitutional: Denies fever, no change in appetite  Respiratory: Denies cough or shortness of breath  Cardiovascular: Denies chest pain or edema    Objective:  Vital Signs:   Vitals:    05/29/25 2308   BP: 109/68   Pulse: 72   Resp: 16   SpO2: 96%         Physical Exam:  Constitutional: Alert and oriented x 4. No acute distress.   HEENT: Sclera anicteric, mucosal membranes moist  Cardiovascular: Regular rate and rhythm.  No murmurs.  Respiratory: Respirations nonlabored, no crepitus  GI: Abdomen nondistended, soft, and nontender.  Normal active bowel sounds.  No masses palpable.   Rectal: Deferred  Musculoskeletal:  No pitting edema of the lower legs.  Neurological: Gross memory appears intact.       Intake/Output:  No intake or output data in the 24 hours ending 05/30/25 0855     Current Medications:  Current Facility-Administered Medications   Medication Dose Route Frequency Provider Last Rate Last Admin    ARIPiprazole (ABILIFY) tablet 10 mg  10 mg Oral Daily Divina Woods MD   10 mg at 05/29/25 0816    Armodafinil TABS 250 mg (Patient Supplied)  250 mg Oral Daily Divina Woods MD        buPROPion (WELLBUTRIN XL) extended release tablet 150 mg  150 mg Oral QAM Divina Woods MD   150 mg at 05/29/25 1041    QUEtiapine (SEROQUEL XR) extended release tablet 600 mg  600 mg Oral Nightly Divina Woods MD   600 mg at 05/29/25 2309    traZODone (DESYREL) tablet 300 mg  300 mg Oral Nightly Divina Woods MD   300 mg at 05/29/25 2309    fluticasone (FLONASE) 50 MCG/ACT nasal spray 1 spray  1

## 2025-05-30 NOTE — PROGRESS NOTES
Internal Medicine Progress Note    Patient: Priti Louise   : 1985   Admit Date: 2025     Date: 2025     Interval History   No acute events overnight.  Patient endorses continued nausea and bloating after meal yesterday night. She denies having any vomiting. Patient scheduled for MRI Abdomen today per GI    Brief plan of the day:  - MRI Abdomen  - N.p.o.        ROS     Review of Systems   Gastrointestinal:  Positive for abdominal pain and nausea.   Psychiatric/Behavioral:  The patient is nervous/anxious.    All other systems reviewed and are negative.       Objective     I/Os:  No intake/output data recorded.    Vital Signs:  Patient Vitals for the past 3 hrs:   BP Temp Temp src Pulse Resp SpO2   25 1027 105/62 98.8 °F (37.1 °C) Oral 74 15 96 %       Physical Exam:  Physical Exam  Eyes:      General: No scleral icterus.     Conjunctiva/sclera: Conjunctivae normal.      Pupils: Pupils are equal, round, and reactive to light.   Cardiovascular:      Rate and Rhythm: Normal rate and regular rhythm.   Pulmonary:      Effort: Pulmonary effort is normal. No respiratory distress.      Breath sounds: No wheezing.   Abdominal:      General: Abdomen is flat.      Palpations: There is no mass.      Tenderness: There is abdominal tenderness. There is no guarding or rebound.   Musculoskeletal:      Right lower leg: No edema.      Left lower leg: No edema.   Skin:     General: Skin is warm.      Coloration: Skin is not jaundiced.      Findings: No bruising.   Neurological:      General: No focal deficit present.      Mental Status: She is alert and oriented to person, place, and time. Mental status is at baseline.         Medication:  Scheduled:     ARIPiprazole  10 mg Oral Daily    Armodafinil  250 mg Oral Daily    buPROPion  150 mg Oral QAM    QUEtiapine  600 mg Oral Nightly    traZODone  300 mg Oral Nightly    fluticasone  1 spray Each Nostril Daily    linaclotide  145 mcg Oral QAM AC

## 2025-05-30 NOTE — PROGRESS NOTES
At 1857, patient stated she\" [facility] therefore she should be able to go home.\" This nurse sent Dr. Dunne a message via [employer] and also explained to patient that the GI physician will also have to read the MRI and okay the discharge. Bedside handoff done with nightshift nurse. Dr. Dunne responded and stated to contact the resident on call regarding patient's request to be discharged. Night shift RN informed of this.

## 2025-05-30 NOTE — PROGRESS NOTES
General Surgery   Daily Progress Note  Patient: Priti Louise      CC: ileus, slow transit constipation history    SUBJECTIVE:   No acute changes, refusing care overnight. Abdominal distension with nausea. No vomiting. Passing flatus but no bowel movements.    ROS:   A 14 point review of systems was conducted, significant findings as noted above. All other systems negative.    OBJECTIVE:    PHYSICAL EXAM:    Vitals:    05/29/25 1057 05/29/25 1419 05/29/25 1941 05/29/25 2308   BP: 104/69 111/72 113/73 109/68   Pulse: 78 68 78 72   Resp: 18 16 16 16   Temp: 98.1 °F (36.7 °C) 98.6 °F (37 °C) 98.1 °F (36.7 °C) 98.2 °F (36.8 °C)   TempSrc: Oral Oral Oral Oral   SpO2: 97% 98% 96% 96%   Weight:       Height:           General appearance: alert, no acute distress  Eyes: PERRL, no scleral icterus  Neck: trachea midline  Chest/Lungs: normal effort, no adventitious breathing, no accessory muscle use, on RA  Cardiovascular: RRR  Abdomen: soft, non-tender, moderately distended, no guarding/rigidity   Skin: warm and dry, no rashes  Extremities: no edema, no cyanosis  Neuro: A&Ox3, no focal deficits, sensation intact    ASSESSMENT & PLAN:   This is a 40 y.o. female with Hx of seizures, PTSD, fibromylagia, depression, anxiety, asthma, no previous surgical history, who presents for distended abdomen and abdominal pain. Ileus, slow transit constipation history     - No acute surgical intervention at this time, no evidence of mechanical SBO  - Recommend bowel rest  - Serial abdominal exams  - GI stool studies   - GI following, recs appreciated, MRE ordered, will follow up  - Medical management per primary team    Niranjan Castro DO  PGY-1, General Surgery Resident  05/30/25 6:14 AM  [employer]  [phone]

## 2025-05-30 NOTE — PROGRESS NOTES
General Surgery   Daily Progress Note  Patient: Priti Louise      CC:  SBO vs enteritis     SUBJECTIVE:   Patient rested overnight. Denies BM    ROS:   A 14 point review of systems was conducted, significant findings as noted above. All other systems negative.    OBJECTIVE:    PHYSICAL EXAM:    Vitals:    05/29/25 1057 05/29/25 1419 05/29/25 1941 05/29/25 2308   BP: 104/69 111/72 113/73 109/68   Pulse: 78 68 78 72   Resp: 18 16 16 16   Temp: 98.1 °F (36.7 °C) 98.6 °F (37 °C) 98.1 °F (36.7 °C) 98.2 °F (36.8 °C)   TempSrc: Oral Oral Oral Oral   SpO2: 97% 98% 96% 96%   Weight:       Height:           General appearance: alert, no acute distress  Eyes: PERRL, no scleral icterus  Neck: trachea midline  Chest/Lungs: normal effort, no adventitious breathing, no accessory muscle use, on RA  Cardiovascular: RRR  Abdomen: soft, non-tender, moderately distended, no guarding/rigidity   Skin: warm and dry, no rashes  Extremities: no edema, no cyanosis  Neuro: A&Ox3, no focal deficits, sensation intact    LABS:   Recent Labs     05/28/25  1902 05/29/25  1453   WBC 7.5 4.2   HGB 12.9 11.5*   HCT 37.9 32.8*   MCV 94.3 92.2    185        Recent Labs     05/28/25  1902 05/29/25  1453    138   K 4.1 3.8    107   CO2 25 23   BUN 12 8   CREATININE 0.8 0.8        Recent Labs     05/28/25 1902   AST 22   ALT 11   BILITOT 0.3   ALKPHOS 45        Recent Labs     05/28/25 1902   LIPASE 39.0      No results for input(s): \"INR\", \"APTT\" in the last 72 hours.    Invalid input(s): \"PROT\"   No results for input(s): \"CKTOTAL\", \"CKMB\", \"CKMBINDEX\", \"TROPONINI\" in the last 72 hours.      ASSESSMENT & PLAN:   This is a 40 y.o. female with Hx of seizures, PTSD, fibromylagia, depression, anxiety, asthma, no previous surgical history, who presents for distended abdomen and abdominal pain.    - follow up MRE    - appreciate GI recs  - no acute surgical intervention indicated at this time  - medical management per primary

## 2025-05-31 VITALS
DIASTOLIC BLOOD PRESSURE: 70 MMHG | BODY MASS INDEX: 22.92 KG/M2 | HEART RATE: 74 BPM | HEIGHT: 66 IN | TEMPERATURE: 98.1 F | SYSTOLIC BLOOD PRESSURE: 116 MMHG | RESPIRATION RATE: 14 BRPM | WEIGHT: 142.64 LBS | OXYGEN SATURATION: 98 %

## 2025-05-31 PROBLEM — K59.01 SLOW TRANSIT CONSTIPATION: Status: ACTIVE | Noted: 2025-05-28

## 2025-05-31 PROCEDURE — 6370000000 HC RX 637 (ALT 250 FOR IP)

## 2025-05-31 PROCEDURE — 2580000003 HC RX 258

## 2025-05-31 PROCEDURE — 2500000003 HC RX 250 WO HCPCS

## 2025-05-31 RX ORDER — POLYETHYLENE GLYCOL 3350 17 G/17G
17 POWDER, FOR SOLUTION ORAL 2 TIMES DAILY
Status: DISCONTINUED | OUTPATIENT
Start: 2025-05-31 | End: 2025-05-31 | Stop reason: HOSPADM

## 2025-05-31 RX ORDER — POLYETHYLENE GLYCOL 3350 17 G/17G
17 POWDER, FOR SOLUTION ORAL 2 TIMES DAILY
COMMUNITY
Start: 2025-05-31 | End: 2025-06-30

## 2025-05-31 RX ADMIN — FLUTICASONE PROPIONATE 1 SPRAY: 50 SPRAY, METERED NASAL at 09:19

## 2025-05-31 RX ADMIN — MONTELUKAST SODIUM 10 MG: 10 TABLET, FILM COATED ORAL at 09:10

## 2025-05-31 RX ADMIN — SODIUM CHLORIDE, SODIUM LACTATE, POTASSIUM CHLORIDE, AND CALCIUM CHLORIDE: .6; .31; .03; .02 INJECTION, SOLUTION INTRAVENOUS at 03:30

## 2025-05-31 RX ADMIN — LINACLOTIDE 145 MCG: 145 CAPSULE, GELATIN COATED ORAL at 09:12

## 2025-05-31 RX ADMIN — ARIPIPRAZOLE 10 MG: 5 TABLET ORAL at 09:10

## 2025-05-31 RX ADMIN — BUPROPION HYDROCHLORIDE 150 MG: 150 TABLET, EXTENDED RELEASE ORAL at 09:10

## 2025-05-31 RX ADMIN — PROPRANOLOL HYDROCHLORIDE 40 MG: 20 TABLET ORAL at 09:10

## 2025-05-31 RX ADMIN — SODIUM CHLORIDE, PRESERVATIVE FREE 10 ML: 5 INJECTION INTRAVENOUS at 09:22

## 2025-05-31 RX ADMIN — ARMODAFINIL 250 MG: 250 TABLET ORAL at 09:20

## 2025-05-31 NOTE — PROGRESS NOTES
Pt discharged home, IV removed. Pt home meds returned to pt. Discharge instructions explained to pt, all questions answered, pt states understanding of all directions. Pt discharged home. All belongings sent with pt including her home meds.

## 2025-05-31 NOTE — DISCHARGE SUMMARY
INTERNAL MEDICINE DEPARTMENT AT THE [facility]  DISCHARGE SUMMARY    Patient ID: Priti Louise                                             Discharge Date: 2025   Patient's PCP: Allison Cuellar MD                                          Discharge Physician: Mike Soto MD MD  Admit Date: 2025   Admitting Physician: Erin Negron MD    PROBLEMS DURING HOSPITALIZATION:  Present on Admission:   Slow transit constipation      DISCHARGE DIAGNOSES:    HPI:  Per H and P \"Priti Louise is an 40 y.o. female w/ hx of medullary sponge kidney, Depression, ADHD, PTSD, s/p  section 6 years ago who presents for evaluation of 3 weeks of progressive diffuse abdominal discomfort and distention which significantly worsened throughout the day on .  She has been eating and drinking normally.  She has noticed some nausea without emesis.  Priti also has chronic diarrhea (typically 2 bowel movements a day) which she states is secondary to taking Linzess which she takes for constipation.  The patient has had issues gaining some weight over the past 3 weeks.  No weight loss.  No night sweats.  No hematochezia.  She does note she has chronic flank pain bilaterally, left more than right.  She also has associated intermittent subjective fevers which have been ongoing for years which she states is related to her medullary sponge kidney.     Of note, she was seen in urgent care approximately 3 weeks ago for a suspected \"kidney infection\" for which she was prescribed an antibiotic whose name she does not recall.  She did finish his antibiotics and only noticed a mild improvement in her symptoms of chronic flank pain and dysuria before her symptoms recurred and continued.     No recent medication or dietary changes aside from the after mentioned antibiotic. No recent travel.  No recent cough, cold, sick contacts, flulike illness.     Patient indicates that she has had lifelong \"kidney pain\" related to

## 2025-05-31 NOTE — PROGRESS NOTES
Unremarkable MRI.  Discharge plans noted.  Patient should follow-up in the office at [phone].

## 2025-05-31 NOTE — CARE COORDINATION
Case Management Assessment            Discharge Note                    Date / Time of Note: 5/31/2025 9:29 AM                  Discharge Note Completed by: BERNADETTE TODD    Patient Name: Priti Louise   YOB: 1985  Diagnosis: Ileus (HCC) [K56.7]   Date / Time: 5/28/2025  6:48 PM    Current PCP: Allison Cuellar MD  Clinic patient: No    Hospitalization in the last 30 days: No       Advance Directives:  Code Status: Full Code  [place] DNR form completed and on chart: Not Indicated    Financial:  Payor: [employer]ETNA / Plan: [employer]ET[employer] NAP CHOICE POS II / Product Type: *No Product type* /      Pharmacy:    [id number] - [place], [place] - [address] -  [phone] - F [phone]  [address] [place] [address]  Phone: [phone] Fax: [phone]    [id number] [place], [place] - [address] - P [phone] - F [phone]  [address] [facility] [address]  Phone: [phone] Fax: [phone]      Assistance purchasing medications?:    Assistance provided by Case Management: None at this time    Does patient want to participate in local refill/ meds to beds program?:      Meds To Beds General Rules:  1. Can ONLY be done Monday- Friday between 8:30am-5pm  2. Prescription(s) must be in pharmacy by 3pm to be filled same day  3.Copy of patient's insurance/ prescription drug card and patient face sheet must be sent along with the prescription(s)  4. Cost of Rx cannot be added to hospital bill. If financial assistance is needed, please contact unit  or ;  or  CANNOT provide pharmacy voucher for patients co-pays  5. Patients can then  the prescription on their way out of the hospital at discharge, or pharmacy can deliver to the bedside if staff is available. (payment due at time of pick-up or delivery - cash, check, or card accepted)     Able to afford home medications/ co-pay costs: Yes    ADLS:  Current PT AM-PAC

## 2025-05-31 NOTE — DISCHARGE INSTRUCTIONS
You were admitted for abdominal bloating. We consulted General Surgery and Gastroenterology during stay. MRI imaging of your abdomen was consistent with constipation. We increased your Linzess dosing to 290 mg daily and started Miralax. Please take as prescribed. Please follow up with your PCP, Gastroenterology and General Surgery in 1 week.    You should return to the emergency department if your symptoms recur, worsen, or do not resolve. In addition, return if:  You have a fever (greater than 101 degrees F),  You have chest pain, shortness of breath, abdominal pain, or uncontrollable vomiting,  You are unable to eat or drink,  You pass out,  You have difficulty moving your arms or legs,   You have difficulty speaking or slurred speech,  Or, you have any concern that you feel needs acute physician evaluation.    Thank you for allowing us to take care of you during your stay at [facility]. Have a great rest of your week!

## 2025-05-31 NOTE — PLAN OF CARE
Problem: Discharge Planning  Goal: Discharge to home or other facility with appropriate resources  Outcome: Progressing  Flowsheets (Taken 5/30/2025 1000)  Discharge to home or other facility with appropriate resources: Identify barriers to discharge with patient and caregiver     Problem: Pain  Goal: Verbalizes/displays adequate comfort level or baseline comfort level  Outcome: Progressing  Flowsheets (Taken 5/30/2025 1400)  Verbalizes/displays adequate comfort level or baseline comfort level:   Encourage patient to monitor pain and request assistance   Assess pain using appropriate pain scale   Implement non-pharmacological measures as appropriate and evaluate response     Problem: ABCDS Injury Assessment  Goal: Absence of physical injury  Outcome: Progressing     Problem: Safety - Adult  Goal: Free from fall injury  Outcome: Progressing     Problem: Chronic Conditions and Co-morbidities  Goal: Patient's chronic conditions and co-morbidity symptoms are monitored and maintained or improved  Outcome: Progressing  Flowsheets (Taken 5/30/2025 1000)  Care Plan - Patient's Chronic Conditions and Co-Morbidity Symptoms are Monitored and Maintained or Improved: Monitor and assess patient's chronic conditions and comorbid symptoms for stability, deterioration, or improvement

## 2025-05-31 NOTE — PROGRESS NOTES
Point of Care Note  General Surgery        Time: 8:58PM  Date: 5/30/2025    MRI abd W WO contrast revealed normal appearance of small bowel without evidence of obstruction, nonspecific trace pelvic fluid, and mild constipation.     Based on labs, clinical exam, and scans, no urgent/emergent surgical intervention is needed at this time or admission   - From surgical standpoint, patient is okay for diet  - Recommend follow-up with GI further further evaluation  - Follow-up with surgery as needed    Please feel free to contact our surgery team for any questions or concerns. Thank you for this consult.     Kassandra Garcia DO  PGY1, General Surgery  05/30/25  8:58 PM  [phone]